# Patient Record
Sex: FEMALE | Race: WHITE | NOT HISPANIC OR LATINO | Employment: OTHER | URBAN - METROPOLITAN AREA
[De-identification: names, ages, dates, MRNs, and addresses within clinical notes are randomized per-mention and may not be internally consistent; named-entity substitution may affect disease eponyms.]

---

## 2017-02-07 ENCOUNTER — COMMUNICATION - HEALTHEAST (OUTPATIENT)
Dept: INTERNAL MEDICINE | Facility: CLINIC | Age: 82
End: 2017-02-07

## 2017-03-10 ENCOUNTER — COMMUNICATION - HEALTHEAST (OUTPATIENT)
Dept: INTERNAL MEDICINE | Facility: CLINIC | Age: 82
End: 2017-03-10

## 2017-03-10 ENCOUNTER — OFFICE VISIT - HEALTHEAST (OUTPATIENT)
Dept: INTERNAL MEDICINE | Facility: CLINIC | Age: 82
End: 2017-03-10

## 2017-03-10 DIAGNOSIS — Z78.9 NONSMOKER: ICD-10-CM

## 2017-03-10 DIAGNOSIS — L73.8 SEBACEOUS HYPERPLASIA: ICD-10-CM

## 2017-03-10 DIAGNOSIS — Z78.9 FULL CODE STATUS: ICD-10-CM

## 2017-03-10 DIAGNOSIS — I10 HTN (HYPERTENSION): ICD-10-CM

## 2017-03-10 DIAGNOSIS — I48.0 PAROXYSMAL ATRIAL FIBRILLATION (H): ICD-10-CM

## 2017-03-10 DIAGNOSIS — F41.9 ANXIETY: ICD-10-CM

## 2017-03-10 DIAGNOSIS — G47.33 OSA (OBSTRUCTIVE SLEEP APNEA): ICD-10-CM

## 2017-03-10 DIAGNOSIS — J30.9 AR (ALLERGIC RHINITIS): ICD-10-CM

## 2017-03-10 DIAGNOSIS — R53.83 FATIGUE: ICD-10-CM

## 2017-03-12 ENCOUNTER — COMMUNICATION - HEALTHEAST (OUTPATIENT)
Dept: INTERNAL MEDICINE | Facility: CLINIC | Age: 82
End: 2017-03-12

## 2017-03-13 ENCOUNTER — COMMUNICATION - HEALTHEAST (OUTPATIENT)
Dept: INTERNAL MEDICINE | Facility: CLINIC | Age: 82
End: 2017-03-13

## 2017-03-13 DIAGNOSIS — G47.33 OSA (OBSTRUCTIVE SLEEP APNEA): ICD-10-CM

## 2017-03-14 ENCOUNTER — RECORDS - HEALTHEAST (OUTPATIENT)
Dept: ADMINISTRATIVE | Facility: OTHER | Age: 82
End: 2017-03-14

## 2017-03-17 ENCOUNTER — COMMUNICATION - HEALTHEAST (OUTPATIENT)
Dept: INTERNAL MEDICINE | Facility: CLINIC | Age: 82
End: 2017-03-17

## 2017-03-29 ENCOUNTER — RECORDS - HEALTHEAST (OUTPATIENT)
Dept: ADMINISTRATIVE | Facility: OTHER | Age: 82
End: 2017-03-29

## 2017-03-30 ENCOUNTER — RECORDS - HEALTHEAST (OUTPATIENT)
Dept: ADMINISTRATIVE | Facility: OTHER | Age: 82
End: 2017-03-30

## 2017-05-18 ENCOUNTER — RECORDS - HEALTHEAST (OUTPATIENT)
Dept: ADMINISTRATIVE | Facility: OTHER | Age: 82
End: 2017-05-18

## 2017-07-24 ENCOUNTER — RECORDS - HEALTHEAST (OUTPATIENT)
Dept: ADMINISTRATIVE | Facility: OTHER | Age: 82
End: 2017-07-24

## 2017-08-14 ENCOUNTER — RECORDS - HEALTHEAST (OUTPATIENT)
Dept: ADMINISTRATIVE | Facility: OTHER | Age: 82
End: 2017-08-14

## 2017-08-15 ENCOUNTER — OFFICE VISIT - HEALTHEAST (OUTPATIENT)
Dept: INTERNAL MEDICINE | Facility: CLINIC | Age: 82
End: 2017-08-15

## 2017-08-15 DIAGNOSIS — Z79.899 CONTROLLED SUBSTANCE AGREEMENT SIGNED: ICD-10-CM

## 2017-08-15 DIAGNOSIS — R20.0 LEFT LEG NUMBNESS: ICD-10-CM

## 2017-08-15 DIAGNOSIS — R68.84 JAW PAIN: ICD-10-CM

## 2017-08-15 DIAGNOSIS — G47.33 OSA (OBSTRUCTIVE SLEEP APNEA): ICD-10-CM

## 2017-08-15 DIAGNOSIS — I10 HTN (HYPERTENSION): ICD-10-CM

## 2017-08-15 DIAGNOSIS — F41.9 ANXIETY: ICD-10-CM

## 2017-09-21 ENCOUNTER — COMMUNICATION - HEALTHEAST (OUTPATIENT)
Dept: INTERNAL MEDICINE | Facility: CLINIC | Age: 82
End: 2017-09-21

## 2017-09-21 DIAGNOSIS — F41.9 ANXIETY: ICD-10-CM

## 2018-02-27 ENCOUNTER — OFFICE VISIT - HEALTHEAST (OUTPATIENT)
Dept: INTERNAL MEDICINE | Facility: CLINIC | Age: 83
End: 2018-02-27

## 2018-02-27 ENCOUNTER — COMMUNICATION - HEALTHEAST (OUTPATIENT)
Dept: INTERNAL MEDICINE | Facility: CLINIC | Age: 83
End: 2018-02-27

## 2018-02-27 DIAGNOSIS — I10 HTN (HYPERTENSION): ICD-10-CM

## 2018-02-27 DIAGNOSIS — Z51.81 ENCOUNTER FOR THERAPEUTIC DRUG LEVEL MONITORING: ICD-10-CM

## 2018-02-27 DIAGNOSIS — R06.02 SOB (SHORTNESS OF BREATH): ICD-10-CM

## 2018-02-27 DIAGNOSIS — F41.9 ANXIETY: ICD-10-CM

## 2018-02-27 DIAGNOSIS — G47.33 OSA (OBSTRUCTIVE SLEEP APNEA): ICD-10-CM

## 2018-02-27 LAB
ALBUMIN UR-MCNC: NEGATIVE MG/DL
AMPHETAMINES UR QL SCN: NORMAL
ANION GAP SERPL CALCULATED.3IONS-SCNC: 8 MMOL/L (ref 5–18)
APPEARANCE UR: CLEAR
BACTERIA #/AREA URNS HPF: ABNORMAL HPF
BARBITURATES UR QL: NORMAL
BENZODIAZ UR QL: NORMAL
BILIRUB UR QL STRIP: NEGATIVE
BNP SERPL-MCNC: 33 PG/ML (ref 0–159)
BUN SERPL-MCNC: 13 MG/DL (ref 8–28)
CALCIUM SERPL-MCNC: 9.2 MG/DL (ref 8.5–10.5)
CANNABINOIDS UR QL SCN: NORMAL
CHLORIDE BLD-SCNC: 107 MMOL/L (ref 98–107)
CO2 SERPL-SCNC: 26 MMOL/L (ref 22–31)
COCAINE UR QL: NORMAL
COLOR UR AUTO: YELLOW
CREAT SERPL-MCNC: 0.72 MG/DL (ref 0.6–1.1)
CREAT UR-MCNC: 23.2 MG/DL
ERYTHROCYTE [DISTWIDTH] IN BLOOD BY AUTOMATED COUNT: 14.3 % (ref 11–14.5)
GFR SERPL CREATININE-BSD FRML MDRD: >60 ML/MIN/1.73M2
GLUCOSE BLD-MCNC: 105 MG/DL (ref 70–125)
GLUCOSE UR STRIP-MCNC: NEGATIVE MG/DL
HCT VFR BLD AUTO: 40.2 % (ref 35–47)
HGB BLD-MCNC: 13.3 G/DL (ref 12–16)
HGB UR QL STRIP: NEGATIVE
KETONES UR STRIP-MCNC: NEGATIVE MG/DL
LEUKOCYTE ESTERASE UR QL STRIP: NEGATIVE
MCH RBC QN AUTO: 25.6 PG (ref 27–34)
MCHC RBC AUTO-ENTMCNC: 33 G/DL (ref 32–36)
MCV RBC AUTO: 78 FL (ref 80–100)
METHADONE UR QL SCN: NORMAL
NITRATE UR QL: NEGATIVE
OPIATES UR QL SCN: NORMAL
OXYCODONE UR QL: NORMAL
PCP UR QL SCN: NORMAL
PH UR STRIP: 7 [PH] (ref 5–8)
PLATELET # BLD AUTO: 216 THOU/UL (ref 140–440)
PMV BLD AUTO: 8.7 FL (ref 7–10)
POTASSIUM BLD-SCNC: 4.6 MMOL/L (ref 3.5–5)
RBC # BLD AUTO: 5.18 MILL/UL (ref 3.8–5.4)
RBC #/AREA URNS AUTO: ABNORMAL HPF
SODIUM SERPL-SCNC: 141 MMOL/L (ref 136–145)
SP GR UR STRIP: 1.01 (ref 1–1.03)
SQUAMOUS #/AREA URNS AUTO: ABNORMAL LPF
UROBILINOGEN UR STRIP-ACNC: ABNORMAL
WBC #/AREA URNS AUTO: ABNORMAL HPF
WBC: 5.5 THOU/UL (ref 4–11)

## 2018-02-27 ASSESSMENT — MIFFLIN-ST. JEOR: SCORE: 1513.88

## 2018-04-25 ENCOUNTER — COMMUNICATION - HEALTHEAST (OUTPATIENT)
Dept: INTERNAL MEDICINE | Facility: CLINIC | Age: 83
End: 2018-04-25

## 2018-04-27 ENCOUNTER — COMMUNICATION - HEALTHEAST (OUTPATIENT)
Dept: FAMILY MEDICINE | Facility: CLINIC | Age: 83
End: 2018-04-27

## 2018-04-27 ENCOUNTER — OFFICE VISIT - HEALTHEAST (OUTPATIENT)
Dept: INTERNAL MEDICINE | Facility: CLINIC | Age: 83
End: 2018-04-27

## 2018-04-27 DIAGNOSIS — I10 HTN (HYPERTENSION): ICD-10-CM

## 2018-04-27 DIAGNOSIS — H26.9 CATARACT, BILATERAL: ICD-10-CM

## 2018-04-27 DIAGNOSIS — J30.9 AR (ALLERGIC RHINITIS): ICD-10-CM

## 2018-04-27 DIAGNOSIS — F41.9 ANXIETY: ICD-10-CM

## 2018-04-27 DIAGNOSIS — Z01.810 PREOPERATIVE CARDIOVASCULAR EXAMINATION: ICD-10-CM

## 2018-04-27 DIAGNOSIS — H35.30 MACULAR DEGENERATION (SENILE) OF RETINA: ICD-10-CM

## 2018-04-27 LAB
ANION GAP SERPL CALCULATED.3IONS-SCNC: 9 MMOL/L (ref 5–18)
BUN SERPL-MCNC: 17 MG/DL (ref 8–28)
CALCIUM SERPL-MCNC: 10 MG/DL (ref 8.5–10.5)
CHLORIDE BLD-SCNC: 108 MMOL/L (ref 98–107)
CO2 SERPL-SCNC: 25 MMOL/L (ref 22–31)
CREAT SERPL-MCNC: 0.74 MG/DL (ref 0.6–1.1)
GFR SERPL CREATININE-BSD FRML MDRD: >60 ML/MIN/1.73M2
GLUCOSE BLD-MCNC: 143 MG/DL (ref 70–125)
POTASSIUM BLD-SCNC: 3.9 MMOL/L (ref 3.5–5)
SODIUM SERPL-SCNC: 142 MMOL/L (ref 136–145)

## 2018-08-10 ENCOUNTER — RECORDS - HEALTHEAST (OUTPATIENT)
Dept: ADMINISTRATIVE | Facility: OTHER | Age: 83
End: 2018-08-10

## 2018-08-31 ENCOUNTER — COMMUNICATION - HEALTHEAST (OUTPATIENT)
Dept: INTERNAL MEDICINE | Facility: CLINIC | Age: 83
End: 2018-08-31

## 2018-08-31 ENCOUNTER — OFFICE VISIT - HEALTHEAST (OUTPATIENT)
Dept: INTERNAL MEDICINE | Facility: CLINIC | Age: 83
End: 2018-08-31

## 2018-08-31 DIAGNOSIS — I10 HTN (HYPERTENSION): ICD-10-CM

## 2018-08-31 DIAGNOSIS — M54.10 BACK PAIN WITH LEFT-SIDED RADICULOPATHY: ICD-10-CM

## 2018-08-31 DIAGNOSIS — Z23 NEED FOR INFLUENZA VACCINATION: ICD-10-CM

## 2018-08-31 DIAGNOSIS — I48.0 PAROXYSMAL ATRIAL FIBRILLATION (H): ICD-10-CM

## 2018-08-31 DIAGNOSIS — E66.01 MORBID OBESITY (H): ICD-10-CM

## 2018-08-31 DIAGNOSIS — H35.30 MACULAR DEGENERATION (SENILE) OF RETINA: ICD-10-CM

## 2018-08-31 DIAGNOSIS — M54.9 BACK PAIN: ICD-10-CM

## 2018-08-31 DIAGNOSIS — Z79.899 CONTROLLED SUBSTANCE AGREEMENT SIGNED: ICD-10-CM

## 2018-08-31 DIAGNOSIS — M79.605 LEFT LEG PAIN: ICD-10-CM

## 2018-08-31 DIAGNOSIS — M25.562 LEFT KNEE PAIN: ICD-10-CM

## 2018-08-31 DIAGNOSIS — F41.9 ANXIETY: ICD-10-CM

## 2018-08-31 DIAGNOSIS — M25.552 HIP PAIN, LEFT: ICD-10-CM

## 2018-09-10 ENCOUNTER — COMMUNICATION - HEALTHEAST (OUTPATIENT)
Dept: INTERNAL MEDICINE | Facility: CLINIC | Age: 83
End: 2018-09-10

## 2018-09-10 DIAGNOSIS — F41.9 ANXIETY: ICD-10-CM

## 2018-09-11 ENCOUNTER — RECORDS - HEALTHEAST (OUTPATIENT)
Dept: ADMINISTRATIVE | Facility: OTHER | Age: 83
End: 2018-09-11

## 2018-09-17 ENCOUNTER — COMMUNICATION - HEALTHEAST (OUTPATIENT)
Dept: INTERNAL MEDICINE | Facility: CLINIC | Age: 83
End: 2018-09-17

## 2018-09-17 DIAGNOSIS — M54.9 BACK PAIN: ICD-10-CM

## 2018-09-17 DIAGNOSIS — M79.662 PAIN OF LEFT LOWER LEG: ICD-10-CM

## 2018-09-17 DIAGNOSIS — M25.552 HIP PAIN, LEFT: ICD-10-CM

## 2018-10-25 ENCOUNTER — RECORDS - HEALTHEAST (OUTPATIENT)
Dept: ADMINISTRATIVE | Facility: OTHER | Age: 83
End: 2018-10-25

## 2018-11-02 ENCOUNTER — COMMUNICATION - HEALTHEAST (OUTPATIENT)
Dept: INTERNAL MEDICINE | Facility: CLINIC | Age: 83
End: 2018-11-02

## 2018-12-10 ENCOUNTER — COMMUNICATION - HEALTHEAST (OUTPATIENT)
Dept: INTERNAL MEDICINE | Facility: CLINIC | Age: 83
End: 2018-12-10

## 2018-12-31 ENCOUNTER — RECORDS - HEALTHEAST (OUTPATIENT)
Dept: ADMINISTRATIVE | Facility: OTHER | Age: 83
End: 2018-12-31

## 2019-02-15 ENCOUNTER — COMMUNICATION - HEALTHEAST (OUTPATIENT)
Dept: SCHEDULING | Facility: CLINIC | Age: 84
End: 2019-02-15

## 2019-02-15 ENCOUNTER — COMMUNICATION - HEALTHEAST (OUTPATIENT)
Dept: INTERNAL MEDICINE | Facility: CLINIC | Age: 84
End: 2019-02-15

## 2019-02-28 ENCOUNTER — OFFICE VISIT - HEALTHEAST (OUTPATIENT)
Dept: INTERNAL MEDICINE | Facility: CLINIC | Age: 84
End: 2019-02-28

## 2019-02-28 DIAGNOSIS — E66.01 MORBID OBESITY (H): ICD-10-CM

## 2019-02-28 DIAGNOSIS — J30.9 AR (ALLERGIC RHINITIS): ICD-10-CM

## 2019-02-28 DIAGNOSIS — Z79.899 CONTROLLED SUBSTANCE AGREEMENT SIGNED: ICD-10-CM

## 2019-02-28 DIAGNOSIS — Z51.81 ENCOUNTER FOR THERAPEUTIC DRUG MONITORING: ICD-10-CM

## 2019-02-28 DIAGNOSIS — I48.0 PAROXYSMAL ATRIAL FIBRILLATION (H): ICD-10-CM

## 2019-02-28 DIAGNOSIS — J32.9 RHINOSINUSITIS: ICD-10-CM

## 2019-02-28 DIAGNOSIS — I10 ESSENTIAL HYPERTENSION: ICD-10-CM

## 2019-02-28 DIAGNOSIS — M79.662 PAIN OF LEFT LOWER LEG: ICD-10-CM

## 2019-02-28 DIAGNOSIS — F41.9 ANXIETY: ICD-10-CM

## 2019-02-28 DIAGNOSIS — Z02.89 ENCOUNTER FOR COMPLETION OF FORM WITH PATIENT: ICD-10-CM

## 2019-02-28 LAB
AMPHETAMINES UR QL SCN: NORMAL
BARBITURATES UR QL: NORMAL
BENZODIAZ UR QL: NORMAL
CANNABINOIDS UR QL SCN: NORMAL
COCAINE UR QL: NORMAL
CREAT UR-MCNC: 79.3 MG/DL
METHADONE UR QL SCN: NORMAL
OPIATES UR QL SCN: NORMAL
OXYCODONE UR QL: NORMAL
PCP UR QL SCN: NORMAL

## 2019-05-20 ENCOUNTER — COMMUNICATION - HEALTHEAST (OUTPATIENT)
Dept: SCHEDULING | Facility: CLINIC | Age: 84
End: 2019-05-20

## 2019-05-20 DIAGNOSIS — J32.9 RHINOSINUSITIS: ICD-10-CM

## 2019-05-29 ENCOUNTER — COMMUNICATION - HEALTHEAST (OUTPATIENT)
Dept: INTERNAL MEDICINE | Facility: CLINIC | Age: 84
End: 2019-05-29

## 2019-05-30 ENCOUNTER — OFFICE VISIT - HEALTHEAST (OUTPATIENT)
Dept: INTERNAL MEDICINE | Facility: CLINIC | Age: 84
End: 2019-05-30

## 2019-05-30 ENCOUNTER — COMMUNICATION - HEALTHEAST (OUTPATIENT)
Dept: INTERNAL MEDICINE | Facility: CLINIC | Age: 84
End: 2019-05-30

## 2019-05-30 DIAGNOSIS — I10 ESSENTIAL HYPERTENSION: ICD-10-CM

## 2019-05-30 DIAGNOSIS — R05.3 PERSISTENT COUGH: ICD-10-CM

## 2019-05-30 DIAGNOSIS — F41.9 ANXIETY: ICD-10-CM

## 2019-05-30 DIAGNOSIS — J32.9 RHINOSINUSITIS: ICD-10-CM

## 2019-06-06 ENCOUNTER — COMMUNICATION - HEALTHEAST (OUTPATIENT)
Dept: INTERNAL MEDICINE | Facility: CLINIC | Age: 84
End: 2019-06-06

## 2019-08-16 ENCOUNTER — COMMUNICATION - HEALTHEAST (OUTPATIENT)
Dept: INTERNAL MEDICINE | Facility: CLINIC | Age: 84
End: 2019-08-16

## 2019-08-16 ENCOUNTER — OFFICE VISIT - HEALTHEAST (OUTPATIENT)
Dept: INTERNAL MEDICINE | Facility: CLINIC | Age: 84
End: 2019-08-16

## 2019-08-16 DIAGNOSIS — G47.33 OSA (OBSTRUCTIVE SLEEP APNEA): ICD-10-CM

## 2019-08-16 DIAGNOSIS — H35.3230 BILATERAL EXUDATIVE AGE-RELATED MACULAR DEGENERATION, UNSPECIFIED STAGE (H): ICD-10-CM

## 2019-08-16 DIAGNOSIS — I10 ESSENTIAL HYPERTENSION: ICD-10-CM

## 2019-08-16 DIAGNOSIS — M76.62 LEFT ACHILLES TENDINITIS: ICD-10-CM

## 2019-08-16 DIAGNOSIS — F41.9 ANXIETY: ICD-10-CM

## 2019-08-16 DIAGNOSIS — Z79.899 CONTROLLED SUBSTANCE AGREEMENT SIGNED: ICD-10-CM

## 2019-08-16 DIAGNOSIS — I48.0 PAROXYSMAL ATRIAL FIBRILLATION (H): ICD-10-CM

## 2019-08-16 DIAGNOSIS — J30.9 ALLERGIC RHINITIS, UNSPECIFIED SEASONALITY, UNSPECIFIED TRIGGER: ICD-10-CM

## 2019-08-16 DIAGNOSIS — R73.9 ELEVATED BLOOD SUGAR: ICD-10-CM

## 2019-08-16 DIAGNOSIS — R06.02 SOB (SHORTNESS OF BREATH): ICD-10-CM

## 2019-08-16 DIAGNOSIS — F51.01 PRIMARY INSOMNIA: ICD-10-CM

## 2019-08-16 LAB
ANION GAP SERPL CALCULATED.3IONS-SCNC: 10 MMOL/L (ref 5–18)
BNP SERPL-MCNC: 26 PG/ML (ref 0–163)
BUN SERPL-MCNC: 12 MG/DL (ref 8–28)
CALCIUM SERPL-MCNC: 10.1 MG/DL (ref 8.5–10.5)
CHLORIDE BLD-SCNC: 106 MMOL/L (ref 98–107)
CO2 SERPL-SCNC: 25 MMOL/L (ref 22–31)
CREAT SERPL-MCNC: 0.71 MG/DL (ref 0.6–1.1)
ERYTHROCYTE [DISTWIDTH] IN BLOOD BY AUTOMATED COUNT: 13.9 % (ref 11–14.5)
GFR SERPL CREATININE-BSD FRML MDRD: >60 ML/MIN/1.73M2
GLUCOSE BLD-MCNC: 103 MG/DL (ref 70–125)
HBA1C MFR BLD: 6.3 % (ref 3.5–6)
HCT VFR BLD AUTO: 41.5 % (ref 35–47)
HGB BLD-MCNC: 13.6 G/DL (ref 12–16)
MCH RBC QN AUTO: 25.5 PG (ref 27–34)
MCHC RBC AUTO-ENTMCNC: 32.8 G/DL (ref 32–36)
MCV RBC AUTO: 78 FL (ref 80–100)
PLATELET # BLD AUTO: 235 THOU/UL (ref 140–440)
PMV BLD AUTO: 8.7 FL (ref 7–10)
POTASSIUM BLD-SCNC: 4.4 MMOL/L (ref 3.5–5)
RBC # BLD AUTO: 5.33 MILL/UL (ref 3.8–5.4)
SODIUM SERPL-SCNC: 141 MMOL/L (ref 136–145)
WBC: 5.1 THOU/UL (ref 4–11)

## 2019-10-11 ENCOUNTER — COMMUNICATION - HEALTHEAST (OUTPATIENT)
Dept: SCHEDULING | Facility: CLINIC | Age: 84
End: 2019-10-11

## 2019-10-14 ENCOUNTER — OFFICE VISIT - HEALTHEAST (OUTPATIENT)
Dept: INTERNAL MEDICINE | Facility: CLINIC | Age: 84
End: 2019-10-14

## 2019-10-14 ENCOUNTER — COMMUNICATION - HEALTHEAST (OUTPATIENT)
Dept: INTERNAL MEDICINE | Facility: CLINIC | Age: 84
End: 2019-10-14

## 2019-10-14 DIAGNOSIS — R60.0 LEG EDEMA, LEFT: ICD-10-CM

## 2019-10-14 DIAGNOSIS — M76.62 LEFT ACHILLES TENDINITIS: ICD-10-CM

## 2019-10-14 DIAGNOSIS — I48.0 PAROXYSMAL ATRIAL FIBRILLATION (H): ICD-10-CM

## 2019-10-14 DIAGNOSIS — J30.9 ALLERGIC RHINITIS, UNSPECIFIED SEASONALITY, UNSPECIFIED TRIGGER: ICD-10-CM

## 2019-10-14 DIAGNOSIS — I10 ESSENTIAL HYPERTENSION: ICD-10-CM

## 2019-11-18 ENCOUNTER — COMMUNICATION - HEALTHEAST (OUTPATIENT)
Dept: INTERNAL MEDICINE | Facility: CLINIC | Age: 84
End: 2019-11-18

## 2020-02-24 ENCOUNTER — OFFICE VISIT - HEALTHEAST (OUTPATIENT)
Dept: INTERNAL MEDICINE | Facility: CLINIC | Age: 85
End: 2020-02-24

## 2020-02-24 DIAGNOSIS — Z79.899 CONTROLLED SUBSTANCE AGREEMENT SIGNED: ICD-10-CM

## 2020-02-24 DIAGNOSIS — I48.0 PAROXYSMAL ATRIAL FIBRILLATION (H): ICD-10-CM

## 2020-02-24 DIAGNOSIS — G47.33 OSA (OBSTRUCTIVE SLEEP APNEA): ICD-10-CM

## 2020-02-24 DIAGNOSIS — F41.9 ANXIETY: ICD-10-CM

## 2020-02-24 DIAGNOSIS — Z51.81 ENCOUNTER FOR THERAPEUTIC DRUG LEVEL MONITORING: ICD-10-CM

## 2020-02-24 DIAGNOSIS — Z87.39 HISTORY OF BURNING PAIN IN LEG: ICD-10-CM

## 2020-02-24 DIAGNOSIS — H35.3230 BILATERAL EXUDATIVE AGE-RELATED MACULAR DEGENERATION, UNSPECIFIED STAGE (H): ICD-10-CM

## 2020-02-24 DIAGNOSIS — E66.01 MORBID OBESITY (H): ICD-10-CM

## 2020-02-24 DIAGNOSIS — I10 ESSENTIAL HYPERTENSION: ICD-10-CM

## 2020-02-24 LAB
AMPHETAMINES UR QL SCN: NORMAL
BARBITURATES UR QL: NORMAL
BENZODIAZ UR QL: NORMAL
CANNABINOIDS UR QL SCN: NORMAL
COCAINE UR QL: NORMAL
CREAT UR-MCNC: 37.6 MG/DL
METHADONE UR QL SCN: NORMAL
OPIATES UR QL SCN: NORMAL
OXYCODONE UR QL: NORMAL
PCP UR QL SCN: NORMAL

## 2020-02-24 ASSESSMENT — ANXIETY QUESTIONNAIRES
7. FEELING AFRAID AS IF SOMETHING AWFUL MIGHT HAPPEN: NOT AT ALL
GAD7 TOTAL SCORE: 4
3. WORRYING TOO MUCH ABOUT DIFFERENT THINGS: SEVERAL DAYS
1. FEELING NERVOUS, ANXIOUS, OR ON EDGE: SEVERAL DAYS
2. NOT BEING ABLE TO STOP OR CONTROL WORRYING: SEVERAL DAYS
IF YOU CHECKED OFF ANY PROBLEMS ON THIS QUESTIONNAIRE, HOW DIFFICULT HAVE THESE PROBLEMS MADE IT FOR YOU TO DO YOUR WORK, TAKE CARE OF THINGS AT HOME, OR GET ALONG WITH OTHER PEOPLE: NOT DIFFICULT AT ALL
6. BECOMING EASILY ANNOYED OR IRRITABLE: NOT AT ALL
5. BEING SO RESTLESS THAT IT IS HARD TO SIT STILL: NOT AT ALL
4. TROUBLE RELAXING: SEVERAL DAYS

## 2020-03-26 ENCOUNTER — COMMUNICATION - HEALTHEAST (OUTPATIENT)
Dept: INTERNAL MEDICINE | Facility: CLINIC | Age: 85
End: 2020-03-26

## 2020-03-26 DIAGNOSIS — F41.9 ANXIETY: ICD-10-CM

## 2020-03-26 DIAGNOSIS — J30.9 ALLERGIC RHINITIS, UNSPECIFIED SEASONALITY, UNSPECIFIED TRIGGER: ICD-10-CM

## 2020-03-26 DIAGNOSIS — I10 ESSENTIAL HYPERTENSION: ICD-10-CM

## 2020-08-18 ENCOUNTER — OFFICE VISIT - HEALTHEAST (OUTPATIENT)
Dept: INTERNAL MEDICINE | Facility: CLINIC | Age: 85
End: 2020-08-18

## 2020-08-18 DIAGNOSIS — H35.3230 BILATERAL EXUDATIVE AGE-RELATED MACULAR DEGENERATION, UNSPECIFIED STAGE (H): ICD-10-CM

## 2020-08-18 DIAGNOSIS — R20.0 LEFT LEG NUMBNESS: ICD-10-CM

## 2020-08-18 DIAGNOSIS — I48.0 PAROXYSMAL ATRIAL FIBRILLATION (H): ICD-10-CM

## 2020-08-18 DIAGNOSIS — I10 ESSENTIAL HYPERTENSION: ICD-10-CM

## 2020-08-18 DIAGNOSIS — R79.9 ABNORMAL FINDING OF BLOOD CHEMISTRY, UNSPECIFIED: ICD-10-CM

## 2020-08-18 DIAGNOSIS — F41.9 ANXIETY: ICD-10-CM

## 2020-08-18 DIAGNOSIS — H93.8X9 SENSATION OF FULLNESS IN EAR, UNSPECIFIED LATERALITY: ICD-10-CM

## 2020-08-18 DIAGNOSIS — Z79.899 CONTROLLED SUBSTANCE AGREEMENT SIGNED: ICD-10-CM

## 2020-08-18 DIAGNOSIS — E66.01 MORBID OBESITY (H): ICD-10-CM

## 2020-08-18 LAB
ANION GAP SERPL CALCULATED.3IONS-SCNC: 9 MMOL/L (ref 5–18)
BUN SERPL-MCNC: 16 MG/DL (ref 8–28)
CALCIUM SERPL-MCNC: 9.5 MG/DL (ref 8.5–10.5)
CHLORIDE BLD-SCNC: 106 MMOL/L (ref 98–107)
CO2 SERPL-SCNC: 28 MMOL/L (ref 22–31)
CREAT SERPL-MCNC: 0.72 MG/DL (ref 0.6–1.1)
ERYTHROCYTE [DISTWIDTH] IN BLOOD BY AUTOMATED COUNT: 13.1 % (ref 11–14.5)
GFR SERPL CREATININE-BSD FRML MDRD: >60 ML/MIN/1.73M2
GLUCOSE BLD-MCNC: 102 MG/DL (ref 70–125)
HBA1C MFR BLD: 6 %
HCT VFR BLD AUTO: 41.1 % (ref 35–47)
HGB BLD-MCNC: 13.6 G/DL (ref 12–16)
MCH RBC QN AUTO: 26.8 PG (ref 27–34)
MCHC RBC AUTO-ENTMCNC: 33 G/DL (ref 32–36)
MCV RBC AUTO: 81 FL (ref 80–100)
PLATELET # BLD AUTO: 205 THOU/UL (ref 140–440)
PMV BLD AUTO: 10 FL (ref 7–10)
POTASSIUM BLD-SCNC: 4.3 MMOL/L (ref 3.5–5)
RBC # BLD AUTO: 5.05 MILL/UL (ref 3.8–5.4)
SODIUM SERPL-SCNC: 143 MMOL/L (ref 136–145)
WBC: 4.7 THOU/UL (ref 4–11)

## 2020-08-18 RX ORDER — FEXOFENADINE HCL 60 MG/1
60 TABLET, FILM COATED ORAL DAILY
Status: SHIPPED | COMMUNITY
Start: 2020-08-18

## 2020-08-18 ASSESSMENT — ANXIETY QUESTIONNAIRES
2. NOT BEING ABLE TO STOP OR CONTROL WORRYING: MORE THAN HALF THE DAYS
3. WORRYING TOO MUCH ABOUT DIFFERENT THINGS: MORE THAN HALF THE DAYS
1. FEELING NERVOUS, ANXIOUS, OR ON EDGE: SEVERAL DAYS
6. BECOMING EASILY ANNOYED OR IRRITABLE: MORE THAN HALF THE DAYS
5. BEING SO RESTLESS THAT IT IS HARD TO SIT STILL: SEVERAL DAYS
4. TROUBLE RELAXING: SEVERAL DAYS
7. FEELING AFRAID AS IF SOMETHING AWFUL MIGHT HAPPEN: MORE THAN HALF THE DAYS
IF YOU CHECKED OFF ANY PROBLEMS ON THIS QUESTIONNAIRE, HOW DIFFICULT HAVE THESE PROBLEMS MADE IT FOR YOU TO DO YOUR WORK, TAKE CARE OF THINGS AT HOME, OR GET ALONG WITH OTHER PEOPLE: NOT DIFFICULT AT ALL
GAD7 TOTAL SCORE: 11

## 2020-08-19 ENCOUNTER — COMMUNICATION - HEALTHEAST (OUTPATIENT)
Dept: INTERNAL MEDICINE | Facility: CLINIC | Age: 85
End: 2020-08-19

## 2020-08-20 ENCOUNTER — RECORDS - HEALTHEAST (OUTPATIENT)
Dept: ADMINISTRATIVE | Facility: OTHER | Age: 85
End: 2020-08-20

## 2020-09-24 ENCOUNTER — RECORDS - HEALTHEAST (OUTPATIENT)
Dept: ADMINISTRATIVE | Facility: OTHER | Age: 85
End: 2020-09-24

## 2020-09-29 ENCOUNTER — COMMUNICATION - HEALTHEAST (OUTPATIENT)
Dept: FAMILY MEDICINE | Facility: CLINIC | Age: 85
End: 2020-09-29

## 2020-09-29 ENCOUNTER — COMMUNICATION - HEALTHEAST (OUTPATIENT)
Dept: SCHEDULING | Facility: CLINIC | Age: 85
End: 2020-09-29

## 2020-09-29 ENCOUNTER — OFFICE VISIT - HEALTHEAST (OUTPATIENT)
Dept: FAMILY MEDICINE | Facility: CLINIC | Age: 85
End: 2020-09-29

## 2020-09-29 DIAGNOSIS — H92.02 LEFT EAR PAIN: ICD-10-CM

## 2020-09-29 DIAGNOSIS — R59.9 SWELLING OF LYMPH NODE: ICD-10-CM

## 2020-09-29 DIAGNOSIS — J30.9 ALLERGIC RHINITIS, UNSPECIFIED SEASONALITY, UNSPECIFIED TRIGGER: ICD-10-CM

## 2020-10-01 ENCOUNTER — COMMUNICATION - HEALTHEAST (OUTPATIENT)
Dept: INTERNAL MEDICINE | Facility: CLINIC | Age: 85
End: 2020-10-01

## 2020-10-02 ENCOUNTER — COMMUNICATION - HEALTHEAST (OUTPATIENT)
Dept: INTERNAL MEDICINE | Facility: CLINIC | Age: 85
End: 2020-10-02

## 2020-10-02 ENCOUNTER — OFFICE VISIT - HEALTHEAST (OUTPATIENT)
Dept: INTERNAL MEDICINE | Facility: CLINIC | Age: 85
End: 2020-10-02

## 2020-10-02 DIAGNOSIS — J02.9 ST (SORE THROAT): ICD-10-CM

## 2020-10-02 DIAGNOSIS — H92.02 LEFT EAR PAIN: ICD-10-CM

## 2020-10-02 DIAGNOSIS — Z23 IMMUNIZATION DUE: ICD-10-CM

## 2020-10-02 DIAGNOSIS — K11.20 SUBMANDIBULAR SIALOADENITIS: ICD-10-CM

## 2020-10-02 DIAGNOSIS — F41.9 ANXIETY: ICD-10-CM

## 2020-10-02 DIAGNOSIS — J31.0 RHINITIS, UNSPECIFIED TYPE: ICD-10-CM

## 2020-10-02 DIAGNOSIS — I10 ESSENTIAL HYPERTENSION: ICD-10-CM

## 2020-10-02 LAB — DEPRECATED S PYO AG THROAT QL EIA: NORMAL

## 2020-10-02 ASSESSMENT — ANXIETY QUESTIONNAIRES
4. TROUBLE RELAXING: NOT AT ALL
1. FEELING NERVOUS, ANXIOUS, OR ON EDGE: SEVERAL DAYS
GAD7 TOTAL SCORE: 4
3. WORRYING TOO MUCH ABOUT DIFFERENT THINGS: NOT AT ALL
6. BECOMING EASILY ANNOYED OR IRRITABLE: SEVERAL DAYS
7. FEELING AFRAID AS IF SOMETHING AWFUL MIGHT HAPPEN: SEVERAL DAYS
2. NOT BEING ABLE TO STOP OR CONTROL WORRYING: SEVERAL DAYS
5. BEING SO RESTLESS THAT IT IS HARD TO SIT STILL: NOT AT ALL
IF YOU CHECKED OFF ANY PROBLEMS ON THIS QUESTIONNAIRE, HOW DIFFICULT HAVE THESE PROBLEMS MADE IT FOR YOU TO DO YOUR WORK, TAKE CARE OF THINGS AT HOME, OR GET ALONG WITH OTHER PEOPLE: NOT DIFFICULT AT ALL

## 2020-10-03 LAB — GROUP A STREP BY PCR: NORMAL

## 2020-10-15 ENCOUNTER — RECORDS - HEALTHEAST (OUTPATIENT)
Dept: ADMINISTRATIVE | Facility: OTHER | Age: 85
End: 2020-10-15

## 2020-10-18 ENCOUNTER — COMMUNICATION - HEALTHEAST (OUTPATIENT)
Dept: INTERNAL MEDICINE | Facility: CLINIC | Age: 85
End: 2020-10-18

## 2020-10-18 DIAGNOSIS — F41.9 ANXIETY: ICD-10-CM

## 2021-01-19 ENCOUNTER — COMMUNICATION - HEALTHEAST (OUTPATIENT)
Dept: ADMINISTRATIVE | Facility: CLINIC | Age: 86
End: 2021-01-19

## 2021-02-22 ENCOUNTER — OFFICE VISIT - HEALTHEAST (OUTPATIENT)
Dept: INTERNAL MEDICINE | Facility: CLINIC | Age: 86
End: 2021-02-22

## 2021-02-22 DIAGNOSIS — E66.01 MORBID OBESITY (H): ICD-10-CM

## 2021-02-22 DIAGNOSIS — I48.0 PAROXYSMAL ATRIAL FIBRILLATION (H): ICD-10-CM

## 2021-02-22 DIAGNOSIS — I10 ESSENTIAL HYPERTENSION: ICD-10-CM

## 2021-02-22 DIAGNOSIS — H35.3230 BILATERAL EXUDATIVE AGE-RELATED MACULAR DEGENERATION, UNSPECIFIED STAGE (H): ICD-10-CM

## 2021-02-22 DIAGNOSIS — F41.9 ANXIETY: ICD-10-CM

## 2021-02-22 DIAGNOSIS — G47.33 OSA (OBSTRUCTIVE SLEEP APNEA): ICD-10-CM

## 2021-02-22 ASSESSMENT — ANXIETY QUESTIONNAIRES
6. BECOMING EASILY ANNOYED OR IRRITABLE: NOT AT ALL
5. BEING SO RESTLESS THAT IT IS HARD TO SIT STILL: NOT AT ALL
2. NOT BEING ABLE TO STOP OR CONTROL WORRYING: SEVERAL DAYS
7. FEELING AFRAID AS IF SOMETHING AWFUL MIGHT HAPPEN: SEVERAL DAYS
4. TROUBLE RELAXING: SEVERAL DAYS
GAD7 TOTAL SCORE: 5
IF YOU CHECKED OFF ANY PROBLEMS ON THIS QUESTIONNAIRE, HOW DIFFICULT HAVE THESE PROBLEMS MADE IT FOR YOU TO DO YOUR WORK, TAKE CARE OF THINGS AT HOME, OR GET ALONG WITH OTHER PEOPLE: NOT DIFFICULT AT ALL
1. FEELING NERVOUS, ANXIOUS, OR ON EDGE: SEVERAL DAYS
3. WORRYING TOO MUCH ABOUT DIFFERENT THINGS: SEVERAL DAYS

## 2021-02-22 ASSESSMENT — PATIENT HEALTH QUESTIONNAIRE - PHQ9: SUM OF ALL RESPONSES TO PHQ QUESTIONS 1-9: 1

## 2021-04-15 ENCOUNTER — COMMUNICATION - HEALTHEAST (OUTPATIENT)
Dept: SCHEDULING | Facility: CLINIC | Age: 86
End: 2021-04-15

## 2021-04-19 ENCOUNTER — COMMUNICATION - HEALTHEAST (OUTPATIENT)
Dept: ADMINISTRATIVE | Facility: CLINIC | Age: 86
End: 2021-04-19

## 2021-04-19 DIAGNOSIS — J32.9 RHINOSINUSITIS: ICD-10-CM

## 2021-04-19 DIAGNOSIS — F41.9 ANXIETY: ICD-10-CM

## 2021-04-19 DIAGNOSIS — I10 ESSENTIAL HYPERTENSION: ICD-10-CM

## 2021-04-19 RX ORDER — HYDROCHLOROTHIAZIDE 25 MG/1
25 TABLET ORAL DAILY
Qty: 90 TABLET | Refills: 3 | Status: SHIPPED | OUTPATIENT
Start: 2021-04-19 | End: 2022-04-21

## 2021-04-19 RX ORDER — LORAZEPAM 0.5 MG/1
TABLET ORAL
Qty: 30 TABLET | Refills: 1 | Status: SHIPPED | OUTPATIENT
Start: 2021-04-19 | End: 2021-10-25

## 2021-04-19 RX ORDER — AMLODIPINE BESYLATE 5 MG/1
5 TABLET ORAL 2 TIMES DAILY
Qty: 180 TABLET | Refills: 3 | Status: SHIPPED | OUTPATIENT
Start: 2021-04-19 | End: 2022-04-21

## 2021-05-27 ASSESSMENT — PATIENT HEALTH QUESTIONNAIRE - PHQ9: SUM OF ALL RESPONSES TO PHQ QUESTIONS 1-9: 1

## 2021-05-28 ASSESSMENT — ANXIETY QUESTIONNAIRES
GAD7 TOTAL SCORE: 4
GAD7 TOTAL SCORE: 5
GAD7 TOTAL SCORE: 4
GAD7 TOTAL SCORE: 11

## 2021-05-28 NOTE — TELEPHONE ENCOUNTER
I will send in a prescription to her pharmacy.  She will need to follow up if she is not improving in 3-4 days.    Denis Frias MD  [facility]  5/20/2019, 9:18 AM

## 2021-05-28 NOTE — TELEPHONE ENCOUNTER
Patient calling to get prescription called in for sinus infection.  Tender around eyes and plugged up.  Gets them 1-2 x year.  No fever.  Sinus congestion.    Has been treating it as if she has allergies but not improving.    Last OV 2/28/19.    Pharmacy confirmed    Patient will be home if we need to call back.    Mandy Estrada, RN, [facility] Nurse Triage/Med Refills RN     Reason for Disposition    Sinus congestion (pressure, fullness) present > 10 days    Protocols used: SINUS PAIN AND CONGESTION-A-OH

## 2021-05-29 NOTE — TELEPHONE ENCOUNTER
Please call patient -    ______________________________________________________________________     Home Phone:  [phone] (home)     Cell phone:   Telephone Information:   Mobile [phone]     ______________________________________________________________________     Her chest x-ray (CXR) is very normal and there is not any signs of anything more serious going on.    Take the prednisone and follow up if not improving.       Denis Frias MD  [facility]  5/30/2019, 12:40 PM   ______________________________________________________________________    Pertinent radiology for this visit includes the following:    XR Chest 2 Views  EXAM DATE:         05/30/2019    EXAM: X-RAY CHEST, 2 VIEWS, FRONTAL AND LATERAL  LOCATION: [facility] [place]  DATE/TIME: 5/30/2019 11:30 AM    INDICATION: Cough  COMPARISON: 03/21/2014.    FINDINGS: The lungs are clear. The heart size and pulmonary vascularity are  normal. No pneumothorax or pleural effusion. Again noted are flowing  syndesmophytes in the thoracic spine consistent with diffuse idiopathic skeletal  hyperostosis.      ______________________________________________________________________

## 2021-05-29 NOTE — TELEPHONE ENCOUNTER
Lets go ahead and continue to monitor her.  Push oral fluid intake if not already doing.    Denis Frias MD  [facility]  6/6/2019, 1:18 PM

## 2021-05-29 NOTE — TELEPHONE ENCOUNTER
Patient Returning Call  Reason for call:  Results  Information relayed to patient:  The writer read the following to patient per Dr Frias: Her chest x-ray (CXR) is very normal and there is not any signs of anything more serious going on.  Patient has additional questions:  Yes  If YES, what are your questions/concerns:  Writer and patient discussed use of prednisone .  She states she has under laying issues with sleep.  Writer encouraged patient to take the two doses by the afternoon  This will help hopefully with her sleep at night .  Okay to leave a detailed message?: No call back needed

## 2021-05-29 NOTE — TELEPHONE ENCOUNTER
I could see the patient at 11 am tomorrow for follow up on this issue.  Thank you.    Please call patient to schedule this.    Denis Frias MD  [facility]  5/29/2019, 2:08 PM

## 2021-05-29 NOTE — TELEPHONE ENCOUNTER
New Appointment Needed  What is the reason for the visit:    Same Date/Next Day Appt Request  What is the reason for your visit?: SINUS AND ALLERGIES AND COLD NOT GETTING BETTER    Provider Preference: PCP only  How soon do you need to be seen?: tomorrow  Waitlist offered?: No  Okay to leave a detailed message:  Yes      Patient states that PCP told her if she wasn't feeling better to schedule an appointment. Patient wants to see PCP.

## 2021-05-29 NOTE — TELEPHONE ENCOUNTER
Please call patient -    ______________________________________________________________________     Home phone:  [phone] (home)     Cell phone:   Telephone Information:   Mobile [phone]       Other contacts:  Name Home Phone Work Phone Mobile Phone Relationship Lgl Gravery   SASHA DAVIS   [phone] Child    MIC MCKINNEY   [phone] Spouse      ______________________________________________________________________     I did get her message about the results of her prednisone.    Please make sure her cough and sinus symptoms are doing better.    If they are and she is just having some lightheadedness, then I think she could follow her symptoms if she is not too miserable.    If she is not doing well in the next week, then she could see a colleague if not improving.     Please update me with any concerns she has.    Denis Frias MD  [facility]  6/6/2019, 12:44 PM

## 2021-05-29 NOTE — TELEPHONE ENCOUNTER
Called pt and pt states that she was having issues with Diarrhea and was really shaky    BP this am was 150/64  Then it was 129/64     Pt is unsure if this was a bug or side effects of the prednisone.  States that she is feeling better today.    Pt states that she finished the prednisone on Tuesday and the Sx were on Wed.  Thinks that this could have been a SE of the medicatoin

## 2021-05-30 VITALS — BODY MASS INDEX: 40.15 KG/M2 | WEIGHT: 237.6 LBS

## 2021-05-31 VITALS — BODY MASS INDEX: 40.26 KG/M2 | WEIGHT: 238.2 LBS

## 2021-06-01 VITALS — WEIGHT: 236.1 LBS | HEIGHT: 65 IN | BODY MASS INDEX: 39.34 KG/M2

## 2021-06-01 VITALS — WEIGHT: 241 LBS | BODY MASS INDEX: 40.73 KG/M2

## 2021-06-02 VITALS — BODY MASS INDEX: 39.88 KG/M2 | WEIGHT: 236 LBS

## 2021-06-02 NOTE — TELEPHONE ENCOUNTER
Please call patient -    ______________________________________________________________________     Home Phone:  [phone] (home)     Cell phone:   Telephone Information:   Mobile [phone]     ______________________________________________________________________     Her ultrasound showed no signs of blood clot in the leg.    There was no signs of cyst in the leg.    The swelling in her leg is likely due to the irritation in her achilles tendon.  The veins might also not clear fluid from this leg as well.    The amlodipine (Norvasc) may also contribute to the swelling.    If the ankle is bothering her more, other tests can be done or she can continue to follow.    Lets have her come back for follow up in 6-8 weeks.  Please help her to schedule an appointment.     Denis Frias MD  [facility]  10/14/2019, 4:27 PM   ______________________________________________________________________    Pertinent radiology for this visit includes the following:    US Venous Leg Left  EXAM DATE:         10/14/2019    EXAM: US LOWER EXTREMITY VEINS LTD LEFT  LOCATION: [facility]  DATE/TIME: 10/14/2019 3:30 PM    INDICATION: Left leg swelling.  Evaluate for DVT.  Eval for baker`s cyst.  COMPARISON: None.  TECHNIQUE: Venous Duplex ultrasound of the left lower extremity with and without  compression, augmentation and duplex. Color flow and spectral Doppler with  waveform analysis performed.    FINDINGS: Exam includes the common femoral, femoral, popliteal, and  contralateral common femoral veins as well as segmentally visualized deep calf  veins and greater saphenous vein.    LEFT: No deep vein thrombosis. No superficial thrombophlebitis. No popliteal  cyst.    IMPRESSION:  No DVT or popliteal cyst in the left leg.      ______________________________________________________________________

## 2021-06-02 NOTE — TELEPHONE ENCOUNTER
"Call from pt       Re:  L calf swelling       > Was at the DC this am and the DC mentioned to her that her L calf looked swollen and was \"pitted\"     > Is able to stand / walk ok but painful to do so     > Yes some warmth to the touch    > L calf does look different form the R     > Foot not cold / blue       Pt would not want to go to ED for eval      Pt tele# [phone]       Julian Fox, RN   Triage and Medication Refills          Reason for Disposition    Thigh, calf, or ankle swelling in only one leg    Protocols used: LEG SWELLING AND EDEMA-A-OH      "

## 2021-06-02 NOTE — PROGRESS NOTES
Wt Readings from Last 20 Encounters:   10/14/19 (!) 224 lb 4 oz (101.7 kg)   08/16/19 (!) 237 lb (107.5 kg)   05/30/19 (!) 238 lb (108 kg)   02/28/19 (!) 236 lb (107 kg)   04/27/18 (!) 241 lb (109.3 kg)   02/27/18 (!) 236 lb 1.6 oz (107.1 kg)   08/15/17 (!) 238 lb 3.2 oz (108 kg)   03/10/17 (!) 237 lb 9.6 oz (107.8 kg)   06/23/16 (!) 231 lb 12.8 oz (105.1 kg)   04/08/16 (!) 234 lb (106.1 kg)   09/11/15 (!) 231 lb 6.4 oz (105 kg)   05/14/15 (!) 236 lb (107 kg)

## 2021-06-02 NOTE — TELEPHONE ENCOUNTER
Pt informed of Dr. Frias's message.    She states an understanding.    Pt scheduled for 11/25/19 at 9:40.    She thanked for the call.

## 2021-06-02 NOTE — PATIENT INSTRUCTIONS - HE
Please follow up if you have any further issues.    You may contact me by phone or [employer] if you are worsening or if things are not improving.    Thank you for coming in today.

## 2021-06-03 VITALS — WEIGHT: 237 LBS | BODY MASS INDEX: 40.05 KG/M2

## 2021-06-03 VITALS
BODY MASS INDEX: 37.9 KG/M2 | WEIGHT: 224.25 LBS | DIASTOLIC BLOOD PRESSURE: 70 MMHG | SYSTOLIC BLOOD PRESSURE: 112 MMHG | OXYGEN SATURATION: 96 % | HEART RATE: 66 BPM

## 2021-06-03 VITALS — WEIGHT: 238 LBS | BODY MASS INDEX: 40.22 KG/M2

## 2021-06-03 NOTE — TELEPHONE ENCOUNTER
Contacted patient and she said she is doing better and is okay with cancelling the appointment. She said she will call back at a later time to schedule follow up in April.

## 2021-06-03 NOTE — TELEPHONE ENCOUNTER
If she is doing well, this appointment can be cancelled.  She could be scheduled for follow up then in April if she wishes or sooner if she wishes.    Denis Frias MD  General Internal Medicine  [facility]  11/18/2019, 1:50 PM

## 2021-06-03 NOTE — TELEPHONE ENCOUNTER
Who is calling:  Patient  Reason for Call:    Patient states her left leg is much better now.  She is questioning if she needs to come in for her appointment with Provider on 11/25/19.  Please reach out to patient and advise.  Thank you.  Date of last appointment with primary care: 10/14/19  Okay to leave a detailed message: Yes

## 2021-06-04 VITALS
SYSTOLIC BLOOD PRESSURE: 136 MMHG | BODY MASS INDEX: 35.96 KG/M2 | WEIGHT: 212.8 LBS | HEART RATE: 62 BPM | OXYGEN SATURATION: 97 % | DIASTOLIC BLOOD PRESSURE: 62 MMHG

## 2021-06-04 VITALS
BODY MASS INDEX: 36.77 KG/M2 | DIASTOLIC BLOOD PRESSURE: 70 MMHG | OXYGEN SATURATION: 96 % | WEIGHT: 217.6 LBS | HEART RATE: 53 BPM | SYSTOLIC BLOOD PRESSURE: 132 MMHG

## 2021-06-05 VITALS
SYSTOLIC BLOOD PRESSURE: 152 MMHG | OXYGEN SATURATION: 98 % | BODY MASS INDEX: 36.17 KG/M2 | DIASTOLIC BLOOD PRESSURE: 64 MMHG | HEART RATE: 78 BPM | WEIGHT: 214 LBS

## 2021-06-05 VITALS
WEIGHT: 222 LBS | BODY MASS INDEX: 37.52 KG/M2 | DIASTOLIC BLOOD PRESSURE: 72 MMHG | OXYGEN SATURATION: 98 % | SYSTOLIC BLOOD PRESSURE: 138 MMHG | HEART RATE: 66 BPM

## 2021-06-06 NOTE — PATIENT INSTRUCTIONS - HE
Please follow up if you have any further issues.    You may contact me by phone or [employer] if you are worsening or if things are not improving.    Thank you for coming in today.

## 2021-06-06 NOTE — PROGRESS NOTES
Urine drug screen correlates well with reported and known medications taken.    Denis Frias MD  [facility]  2/24/2020, 9:00 PM

## 2021-06-06 NOTE — PROGRESS NOTES
Wt Readings from Last 20 Encounters:   02/24/20 217 lb 9.6 oz (98.7 kg)   10/14/19 (!) 224 lb 4 oz (101.7 kg)   08/16/19 (!) 237 lb (107.5 kg)   05/30/19 (!) 238 lb (108 kg)   02/28/19 (!) 236 lb (107 kg)   04/27/18 (!) 241 lb (109.3 kg)   02/27/18 (!) 236 lb 1.6 oz (107.1 kg)   08/15/17 (!) 238 lb 3.2 oz (108 kg)   03/10/17 (!) 237 lb 9.6 oz (107.8 kg)   06/23/16 (!) 231 lb 12.8 oz (105.1 kg)   04/08/16 (!) 234 lb (106.1 kg)   09/11/15 (!) 231 lb 6.4 oz (105 kg)   05/14/15 (!) 236 lb (107 kg)

## 2021-06-07 NOTE — TELEPHONE ENCOUNTER
Controlled Substance Refill Request  Medication Name:   Requested Prescriptions     Pending Prescriptions Disp Refills     LORazepam (ATIVAN) 0.5 MG tablet 30 tablet 2     Sig: Take 1 tablet (0.5 mg total) by mouth 2 (two) times a day as needed for anxiety. May refill every 30 days only.     Date Last Fill: 8/16/19  [facility]ed [id number]  Submit electronically to pharmacy  Controlled Substance Agreement on file:   Encounter-Level CSA Scan Date - 08/31/2018:    Scan on 9/5/2018  3:35 PM           Encounter-Level CSA Scan Date - 08/15/2017:    Scan on 8/21/2017  1:31 PM: [facility]        Last office visit:  2/24/2020

## 2021-06-07 NOTE — TELEPHONE ENCOUNTER
Last Office Visit  2/24/2020 Denis Frias MD    Notes:  Patient comes in today for follow-up of multiple issues.     She was recently in [place] and did well down there.     She has lost weight related to changing her snacking and smaller portions.  She has done well with this.  Her weight is down as noted below.  This is expected and not associated with GI symptoms.     Reviewed her high blood pressure and her blood pressure is doing well generally.  Her home records are generally in the 110s to 120s.  She denies any worsening edema or shortness of breath.  She denies any chest pain.     We reviewed some burning in her left lateral leg.  It is over the left lateral upper leg.  Her MRI of her back in the past has been good.  She does have some chronic neck pain that has not been worked up.  She sees a chiropractor for this.  It does not hurt more to lay on the left side.     Reviewed her anxiety and things are stable here.     Reviewed her sleep apnea and there is been no changes.    Last Filled:  LORazepam (ATIVAN) 0.5 MG tablet  30 tablet  2  8/16/2019   No    Sig - Route: Take 1 tablet (0.5 mg total) by mouth 2 (two) times a day as needed for anxiety. May refill every 30 days only. - Oral    Sent to pharmacy as: LORazepam (ATIVAN) 0.5 MG tablet    E-Prescribing Status: Receipt confirmed by pharmacy (8/16/2019  9:04 AM CDT)          Lab Results   Component Value Date    AMPHET Screen Negative 02/24/2020    HEBENZODIAZ Screen Negative 02/24/2020    OPIATES Screen Negative 02/24/2020    PCP Screen Negative 02/24/2020    THC Screen Negative 02/24/2020    BARBIT Screen Negative 02/24/2020    COCAINEMETAB Screen Negative 02/24/2020    METHADNE Screen Negative 02/24/2020    OXYCODONE Screen Negative 02/24/2020    CREAUR 37.6 02/24/2020         Next OV:  Visit date not found      Encounter-Level CSA Scan Date - 08/31/2018:    Scan on 9/5/2018  3:35 PM           Encounter-Level CSA Scan Date - 08/15/2017:    Scan on  8/21/2017  1:31 PM: [facility]           A urine drug screen was performed on 2/24/2020   and is negative.       reviewed and results are as follows:    Unable to pull     Medication teed up for provider signature - please adjust as appropriate.

## 2021-06-10 NOTE — PATIENT INSTRUCTIONS - HE

## 2021-06-11 NOTE — TELEPHONE ENCOUNTER
Patient complains of some swelling on left neck/gland; unsure of size.  Also complains of left jaw pain and left ear fullness; denies fever.  Just took some Aleve.  Transferred to [facility] for same day Virtual Visit.    Marisa Alcala RN  [facility] Triage Nurse Advisor    Reason for Disposition    Single large node and size > 1 inch (2.5 cm)    Additional Information    Negative: Sounds like a life-threatening emergency to the triager    Negative: Sore throat is the main symptom and has swollen node in the neck that is < 1 inch (2.5 cm) in size    Negative: Node is in the neck and causes difficulty breathing    Negative: Patient sounds very sick or weak to the triager    Negative: Node is in the neck and can't swallow fluids    Negative: Fever > 103 F (39.4 C)    Negative: Lump or swelling in groin and pulsating (like heartbeat)    Protocols used: LYMPH NODES - LAIGLLR-M-VP

## 2021-06-11 NOTE — PROGRESS NOTES
"Francesca Jay is a 85 y.o. female who is being evaluated via a billable telephone visit.      The patient has been notified of following:     \"This telephone visit will be conducted via a call between you and your physician/provider. We have found that certain health care needs can be provided without the need for a physical exam.  This service lets us provide the care you need with a short phone conversation.  If a prescription is necessary we can send it directly to your pharmacy.  If lab work is needed we can place an order for that and you can then stop by our lab to have the test done at a later time.    Telephone visits are billed at different rates depending on your insurance coverage. During this emergency period, for some insurers they may be billed the same as an in-person visit.  Please reach out to your insurance provider with any questions.    If during the course of the call the physician/provider feels a telephone visit is not appropriate, you will not be charged for this service.\"    Patient has given verbal consent to a Telephone visit? Yes    What phone number would you like to be contacted at? [phone]    Patient would like to receive their AVS by AVS Preference: Mail a copy.    Additional provider notes:  Assessment / Impression     1. Left ear pain     2. Swelling of lymph node     3. Allergic rhinitis, unspecified seasonality, unspecified trigger           Plan:     I explained that it is difficult to assess her ear pain symptoms over the phone.  It sounds like she could either have an ear infection or eustachian tube dysfunction from allergies.  It also sounds like she has a small tender lymph node just anterior to the left ear.  Since she reports symptoms seem to be getting worse I recommended she be seen for clinic appointment.  We are trying to get her in somewhere today.  In the meantime, I recommended she continue her antihistamine.  She may take Tylenol as needed.  She could add " Flonase to the Allegra if she chooses.    Subjective:      HPI: Francesca Jay is a 85 y.o. female who is scheduled for a virtual/telephone appointment to discuss concerns about a small, tender bump that she can feel just anterior to her left ear.  Symptoms started a couple of days ago.  She is also noticing some discomfort in her jaw there and within her ear.  She denies having fevers.  She has a history of allergies and she has noticed some sinus drainage off and on.  She takes Allegra for this.  She denies having ear plugging or ear drainage symptoms.  She is tried Flonase prior to Allegra, but this did not seem to help much.  She denies feeling ill.        Review of Systems  All other systems reviewed and are negative.     Social History     Tobacco Use   Smoking Status Never Smoker   Smokeless Tobacco Never Used       Family History   Problem Relation Age of Onset     Alcohol abuse Mother      Liver disease Mother      Alcohol abuse Father      Colon cancer Brother      Lung cancer Brother      Lung disease Brother      OCD Son      Depression Son        Objective:     There were no vitals taken for this visit.  She does not appear to be in acute distress.  She answers questions appropriately.    No results found for this or any previous visit (from the past 168 hour(s)).    Current Outpatient Medications   Medication Sig Note     amLODIPine (NORVASC) 5 MG tablet Take 1 tablet (5 mg total) by mouth 2 (two) times a day.      aspirin 325 MG tablet Take 325 mg by mouth. 5/14/2015: Received from: [facility]     fexofenadine (ALLEGRA) 60 MG tablet Take 60 mg by mouth daily.      hydroCHLOROthiazide (HYDRODIURIL) 25 MG tablet Take 1 tablet (25 mg total) by mouth daily.      LORazepam (ATIVAN) 0.5 MG tablet Take 1 tablet (0.5 mg total) by mouth 2 (two) times a day as needed for anxiety. May refill every 30 days only.      magnesium 100 mg cap  5/14/2015: Received from: [facility]     OMEGA-3 FATTY ACIDS ORAL  Take 2 g by mouth. 5/14/2015: [facility]     vit A/vit C/vit E/zinc/copper (PRESERVISION AREDS ORAL) Take by mouth daily.      cholecalciferol, vitamin D3, 5,000 unit Tab Take by mouth.      propafenone (RYTHMOL) 300 MG tablet Take 2 tablets (600 mg total) by mouth once for 1 dose. As needed for atrial fibrillation.            Phone call duration:  11 minutes    Tai Bravo DO

## 2021-06-11 NOTE — TELEPHONE ENCOUNTER
Patient unable to get into [facility] but says there is another [facility] clinic that she has been to that she will go to today per pt.

## 2021-06-11 NOTE — TELEPHONE ENCOUNTER
----- Message from Tai Bravo DO sent at 9/29/2020  2:13 PM CDT -----  Regarding: Needs appointment  Please contact this patient and help her schedule an appointment at the [facility] for this afternoon if possible to have her left ear pain symptoms evaluated.  If nothing is available please give her the information about going into the walk-in clinic.  Thank you, DE

## 2021-06-12 NOTE — TELEPHONE ENCOUNTER
Please call patient -    ______________________________________________________________________     Home phone:  [phone] (home)     Cell phone:   Telephone Information:   Mobile [phone]       Other contacts:  Name Home Phone Work Phone Mobile Phone Relationship Lgl Astrid   SASHA DAVIS   [phone] MIC Cabezas   [phone] Spouse      ______________________________________________________________________     Strep test negative.    Okay from patient to leave message if no answer.    Denis Frias MD  [facility]  10/2/2020, 12:57 PM     ______________________________________________________________________    Recent Results (from the past 240 hour(s))   Rapid Strep A Screen-Throat swab    Specimen: Throat   Result Value Ref Range    Rapid Strep A Antigen No Group A Strep detected, presumptive negative No Group A Strep detected, presumptive negative       ______________________________________________________________________

## 2021-06-12 NOTE — PATIENT INSTRUCTIONS - HE

## 2021-06-14 NOTE — TELEPHONE ENCOUNTER
I am recommending that she have it even in light of her history of allergy issues.    Please call the patient back and thank you.    Denis Frias MD  General Internal Medicine  [facility]  1/19/2021, 10:06 PM

## 2021-06-14 NOTE — TELEPHONE ENCOUNTER
Reason for Call:  Other call back      Detailed comments: Patient calling this afternoon to inquire about getting the covid 19 vaccine. She is wondering if this is a good idea for her to do or if it's safe for her to get it and if Dr Frias thinks she should get it.  Please call patient to discuss.    Phone Number Patient can be reached at: Home number on file [phone] (home)    Best Time: Any time    Can we leave a detailed message on this number?: Yes    Call taken on 1/19/2021 at 2:31 PM by Pedro Laird

## 2021-06-15 NOTE — PATIENT INSTRUCTIONS - HE
Please follow up if you have any further issues.    You may contact me by phone or MyChart if you are worsening or if things are not improving.    ______________________________________________________________________     Please remember that you can call [phone] to schedule an appointment.     You can schedule appointments 24 hours a day, 7 days a week.  Sometimes the best time to schedule an appointment is after clinic hours when less people are calling in.  Weekends are another option for calling in to schedule appointments.

## 2021-06-16 PROBLEM — Z79.899 CONTROLLED SUBSTANCE AGREEMENT SIGNED: Chronic | Status: ACTIVE | Noted: 2017-08-16

## 2021-06-16 PROBLEM — F51.01 PRIMARY INSOMNIA: Status: ACTIVE | Noted: 2019-08-16

## 2021-06-16 NOTE — TELEPHONE ENCOUNTER
Please notify the patient that refills were sent.    Denis Frias MD  General Internal Medicine  [facility]  4/19/2021, 8:27 AM

## 2021-06-16 NOTE — TELEPHONE ENCOUNTER
Alternate sent in to pharmacy.      [id number] - [place], [place] - [address] AT [facility] [address]  [address]  [facility] [address]  Phone: [phone] Fax: [phone]

## 2021-06-16 NOTE — TELEPHONE ENCOUNTER
Reason for Call:  Medication or medication refill:    Do you use a [facility]?  Name of the pharmacy and phone number for the current request: [facility]    Name of the medication requested:     Amlodipine 5 mg  Hydrochlorothiazide 25 mg  Lorazepam 0.5 mg    Other request: Patient states that she's nearly out of medication at this time.    Can we leave a detailed message on this number? Yes    Phone number patient can be reached at: Home number on file [phone] (home)    Best Time: Any time    Call taken on 4/19/2021 at 8:04 AM by Pedro Laird

## 2021-06-16 NOTE — TELEPHONE ENCOUNTER
I sent in a prescription for amoxicillin but she should follow up in clinic sooner if she wants to make sure that this is what is going on or if she is not improving.    Denis Frias MD  General Internal Medicine  [facility]  4/19/2021, 4:45 PM

## 2021-06-16 NOTE — TELEPHONE ENCOUNTER
[facility] calling to report of drug interaction.     Pencillin allergy and Amoxicillin was ordered.

## 2021-06-16 NOTE — TELEPHONE ENCOUNTER
"Has anxiety and last few days slight HA.  Has been walking.  Last night bad night of dreams and anxious.    Had episodes of afib.3x in 10 years.  Bp is up the past few days.  Light headed and dizzy feeling.    Needs to be seen today in clinic. Warm transferred to scheduling    Mandy Estrdaa RN [facility] Triage Nurse Advisor    Reason for Disposition    [1] MILD dizziness (e.g., walking normally) AND [2] has NOT been evaluated by physician for this  (Exception: dizziness caused by heat exposure, sudden standing, or poor fluid intake)    Additional Information    Negative: Severe difficulty breathing (e.g., struggling for each breath, speaks in single words)    Negative: [1] Difficulty breathing or swallowing AND [2] started suddenly after medicine, an allergic food or bee sting    Negative: Shock suspected (e.g., cold/pale/clammy skin, too weak to stand, low BP, rapid pulse)    Negative: Difficult to awaken or acting confused (e.g., disoriented, slurred speech)    Negative: [1] Weakness (i.e., paralysis, loss of muscle strength) of the face, arm or leg on one side of the body AND [2] sudden onset AND [3] present now    Negative: [1] Numbness (i.e., loss of sensation) of the face, arm or leg on one side of the body AND [2] sudden onset AND [3] present now    Negative: [1] Loss of speech or garbled speech AND [2] sudden onset AND [3] present now    Negative: Overdose (accidental or intentional) of medications    Negative: [1] Fainted > 15 minutes ago AND [2] still feels too weak or dizzy to stand    Negative: Heart beating < 50 beats per minute OR > 140 beats per minute    Negative: Sounds like a life-threatening emergency to the triager    Negative: Chest pain    Negative: Rectal bleeding, bloody stool, or tarry-black stool    Negative: [1] Vomiting AND [2] contains red blood or black (\"coffee ground\") material    Negative: Vomiting is main symptom    Negative: Diarrhea is main symptom    Negative: Headache is main " "symptom    Negative: Patient states that he/she is having an anxiety/panic attack    Negative: Dizziness from low blood sugar (i.e., < 60 mg/dl or 3.5 mmol/l)    Negative: Dizziness is described as a spinning sensation (i.e., vertigo)    Negative: Heat exhaustion suspected (i.e., dehydration from heat exposure)    Negative: Difficulty breathing    Negative: SEVERE dizziness (e.g., unable to stand, requires support to walk, feels like passing out now)    Negative: Extra heart beats OR irregular heart beating  (i.e., \"palpitations\")    Negative: [1] Drinking very little AND [2] dehydration suspected (e.g., no urine > 12 hours, very dry mouth, very lightheaded)    Negative: Patient sounds very sick or weak to the triager    Negative: [1] Dizziness caused by heat exposure, sudden standing, or poor fluid intake AND [2] no improvement after 2 hours of rest and fluids    Negative: [1] Fever > 103 F (39.4 C) AND [2] not able to get the fever down using Fever Care Advice    Negative: [1] Fever > 101 F (38.3 C) AND [2] age > 60    Negative: [1] Fever > 100.0 F (37.8 C) AND [2] bedridden (e.g., nursing home patient, CVA, chronic illness, recovering from surgery)    Negative: [1] Fever > 100.0 F (37.8 C) AND [2] diabetes mellitus or weak immune system (e.g., HIV positive, cancer chemo, splenectomy, organ transplant, chronic steroids)    Negative: [1] MODERATE dizziness (e.g., interferes with normal activities) AND [2] has NOT been evaluated by physician for this  (Exception: dizziness caused by heat exposure, sudden standing, or poor fluid intake)    Negative: Fever present > 3 days (72 hours)    Negative: Taking a medicine that could cause dizziness (e.g., blood pressure medications, diuretics)    Negative: [1] MODERATE dizziness (e.g., interferes with normal activities) AND [2] has been evaluated by physician for this    Protocols used: DIZZINESS - [id number]      "

## 2021-06-16 NOTE — TELEPHONE ENCOUNTER
Called and realyed message to patient/    She stated that she also left a message on providers phone but wanted to pass message on. States she has had on and off ear fullness, throat feels full. Thinks she has a sinus infection. Has been going on for about 2 weeks now. Wondering if she can get something for this.     Takes allegra that does not seem to be helping. Dizziness and headaches off and on. States provider is aware this happens from time to time.

## 2021-06-17 NOTE — PATIENT INSTRUCTIONS - HE
Patient Instructions by Denis Frias MD at 8/16/2019  8:50 AM     Author: Denis Frias MD Service: -- Author Type: Physician    Filed: 8/16/2019  9:27 AM Encounter Date: 8/16/2019 Status: Addendum    : Denis Frias MD (Physician)    Related Notes: Original Note by Denis Frias MD (Physician) filed at 8/16/2019  9:16 AM       1. You may use melatonin 3-10 mg at night for sleep.  5 mg at night works for most.  2. Blood work is done.  3. Follow up again in 6 months, sooner if issues.    ______________________________________________________________________         Patient Education     Understanding Achilles Tendonitis    Achilles tendonitis is an overuse injury. It results in inflammation of the Achilles tendon. This tendon is found on the back of the ankle. It links the calf muscle to the heel bone. It helps you do pushing-off movements like running or standing on your toes.     How to say it  -[facility]   What causes Achilles tendonitis?  Achilles tendonitis can happen if you do an activity like running, walking, or jumping too much. This overuse can strain, or pull, the tendon. It may lead to minor tearing of the tendon. An injury to the lower leg or foot can also cause it.  If you dont warm up before taking part in sports such as basketball, you are more likely to suffer from this condition. You are also more prone to it if you do too much of such an activity too quickly. Proper training and rest can help prevent it.  Symptoms of Achilles tendonitis  The main symptom of Achilles tendonitis is pain. This pain mostly happens when you move the ankle. The tendon may also feel stiff after a period of no activity, such as sleeping. It may also become swollen. You may hear a crackling sound when you move your ankle.  Treatment for Achilles tendonitis  Symptoms often get better after starting treatment. A full recovery may take several months. Treatments include:    Rest. You  should stop or change the activity that caused the injury. The tendon will then have time to heal.    Cold or heat pack. These help reduce pain and swelling.    Prescription or over-the-counter pain medicines. These help reduce pain and swelling.    Shoe inserts. These devices can reduce strain on the Achilles tendon when you move. You may then feel less pain.    Stretching and strengthening exercises. Certain exercises can help you regain flexibility and strength in your Achilles tendon.    Surgery. This option can fix the injured tendon. But you dont often need it unless other treatments dont work.     When to call your healthcare provider   Call your healthcare provider right away if you have any of these:    Fever of 100.4 F (38 C) or higher, or as directed    Pain that gets worse    Symptoms that dont get better, or get worse    New symptoms    Date Last Reviewed: 3/10/2016    3971-8796 [employer]. [address] [place] [address], [place] [address]. All rights reserved. This information is not intended as a substitute for professional medical care. Always follow your healthcare professional's instructions.

## 2021-06-19 NOTE — LETTER
Letter by Denis Frias MD at      Author: Denis Frias MD Service: -- Author Type: --    Filed:  Encounter Date: 8/16/2019 Status: (Other)         [place] [facility]  08/16/19    Patient: Francesca Jay  YOB: 1935  Medical Record Number: [id number]  CSN: [id number]                                                                MEDICATION: LORAZEPAM  Non-opioid Controlled Substance Agreement    I understand that my care provider has prescribed a controlled substance to help manage my condition(s). I am taking this medicine to help me function or work. I know this is strong medicine, and that it can cause serious side effects. Controlled substances can be sedating, addicting and may cause a dependency on the drug. They can affect my ability to drive or think, and cause depression. They need to be taken exactly as prescribed. Combining controlled substances with certain medicines or chemicals (such as cocaine, sedatives and tranquilizers, sleeping pills, meth) can be dangerous or even fatal. Also, if I stop controlled substances suddenly, I may have severe withdrawal symptoms.  If not helpful, I may be asked to stop them.    The risks, benefits, and side effects of these medicine(s) were explained to me. I agree that:    1. I will take part in other treatments as advised by my care team. This may be psychiatry or counseling, physical therapy, behavioral therapy, group treatment or a referral to a pain clinic. I will reduce or stop my medicine when my care team tells me to do so.  2. I will take my medicines as prescribed. I will not change the dose or schedule unless my care team tells me to. There will be no refills if I run out early.  I may be contactedwithout warning and asked to complete a urine drug test or pill count at any time.   3. I will keep all my appointments, and understand this is part of the monitoring of controlled substances. My care team may require an office visit for  EVERY controlled substance refill. If I miss appointments or dont follow instructions, my care team may stop my medicine.  4. I will not ask other providers to prescribe controlled substances, and I will not accept controlled substances from other people. If I need another prescribed controlled substance for a new reason, I will tell my care team within 1 business day.  5. I will use one pharmacy to fill all of my controlled substance prescriptions, and it is up to me to make sure that I do not run out of my medicines on weekends or holidays. If my care team is willing to refill my controlled substance prescription without a visit, I must request refills only during office hours, refills may take up to 3 days to process, and it may take up to 5 to 7 days for my medicine to be mailed and ready at my pharmacy. Prescriptions will not be mailed anywhere except my pharmacy.    6. I am responsible for my prescriptions. If the medicine/prescription is lost or stolen, it will not be replaced. I also agree not to share controlled substance medicines with anyone.          [place] [facility]  08/16/19  Patient:  Francesca Jay  YOB: 1935  Medical Record Number: [id number]  CSN: [id number]    7. I agree to not use ANY illegal or recreational drugs. This includes marijuana, cocaine, bath salts or other drugs. I agree not to use alcohol unless my care team says I may. I agree to give urine samples whenever asked. If I dont give a urine sample, the care team may stop my medicine.    8. If I enroll in the [place] Medical Marijuana program, I will tell my care team. I will also sign an agreement to share my medical records with my care team.    9. I will bring in my list of medicines (or my medicine bottles) each time I come to the clinic.   10. I will tell my care team right away if I become pregnant or have a new medical problem treated outside of my regular clinic.  11. I understand that this medicine can  affect my thinking and judgment. It may be unsafe for me to drive, use machinery and do dangerous tasks. I will not do any of these things until I know how the medicine affects me. If my dose changes, I will wait to see how it affects me. I will contact my care team if I have concerns about medicine side effects.    I understand that if I do not follow any of the conditions above, my prescriptions or treatment may be stopped.      I agree that my provider, clinic care team, and pharmacy may work with any city, state or federal law enforcement agency that investigates the misuse, sale, or other diversion of my controlled medicine. I will allow my provider to discuss my care with or share a copy of this agreement with any other treating provider, pharmacy or emergency room where I receive care. I agree to give up (waive) any right of privacy or confidentiality with respect to these consents.   I have read this agreement and have asked questions about anything I did not understand.    ___________________________________________________________________________  Patient signature - Date/Time  -Francesca Jay                                      ___________________________________________________________________________  Witness signature                                                                    ___________________________________________________________________________  Provider signature- Denis Frias MD

## 2021-06-19 NOTE — LETTER
Letter by Denis Frias MD at      Author: Denis Frias MD Service: -- Author Type: --    Filed:  Encounter Date: 8/16/2019 Status: (Other)         8/16/2019    Francesca Jay   [address] [address] [place] [address]         Dear Francesca,    It was good to see you in clinic.  I hope your questions were answered at the time of your visit.    The results of your tests from your visit were as follows:    Resulted Orders   Glycosylated Hemoglobin A1c   Result Value Ref Range    Hemoglobin A1c 6.3 (H) 3.5 - 6.0 %   Basic Metabolic Panel   Result Value Ref Range    Sodium 141 136 - 145 mmol/L    Potassium 4.4 3.5 - 5.0 mmol/L    Chloride 106 98 - 107 mmol/L    CO2 25 22 - 31 mmol/L    Anion Gap, Calculation 10 5 - 18 mmol/L    Glucose 103 70 - 125 mg/dL    Calcium 10.1 8.5 - 10.5 mg/dL    BUN 12 8 - 28 mg/dL    Creatinine 0.71 0.60 - 1.10 mg/dL    GFR MDRD Af Amer >60 >60 mL/min/1.73m2    GFR MDRD Non Af Amer >60 >60 mL/min/1.73m2    Narrative    Fasting Glucose reference range is 70-99 mg/dL per  American Diabetes Association (ADA) guidelines.   BNP(B-type Natriuretic Peptide)   Result Value Ref Range    BNP 26 0 - 163 pg/mL   HM2(CBC w/o Differential)   Result Value Ref Range    WBC 5.1 4.0 - 11.0 thou/uL    RBC 5.33 3.80 - 5.40 mill/uL    Hemoglobin 13.6 12.0 - 16.0 g/dL    Hematocrit 41.5 35.0 - 47.0 %    MCV 78 (L) 80 - 100 fL    MCH 25.5 (L) 27.0 - 34.0 pg    MCHC 32.8 32.0 - 36.0 g/dL    RDW 13.9 11.0 - 14.5 %    Platelets 235 140 - 440 thou/uL    MPV 8.7 7.0 - 10.0 fL     Your blood counts are normal and you are not anemic.     Your heart tests were very good.     Your electrolytes were normal.  Your kidney tests were normal.      Your blood sugar is slightly high and you are at risk for developing diabetes.  Please work on preventing weight gain and lose weight if able.    Please follow up again in 6 months, sooner if issues.    If you have any questions regarding these results, please feel free to  contact me at [phone].  I wish you the best of health!        Sincerely,      Denis Frias MD  General Internal Medicine  [facility]

## 2021-06-20 NOTE — LETTER
Letter by Denis Frias MD at      Author: Denis Frias MD Service: -- Author Type: --    Filed:  Encounter Date: 8/19/2020 Status: (Other)         8/19/2020    Francesca Jay   [address] [address] [place] [address]         Dear Francesca,    It was good to see you in clinic.  I hope your questions were answered at the time of your visit.    The results of your tests from your visit were as follows:    Resulted Orders   HM2(CBC w/o Differential)   Result Value Ref Range    WBC 4.7 4.0 - 11.0 thou/uL    RBC 5.05 3.80 - 5.40 mill/uL    Hemoglobin 13.6 12.0 - 16.0 g/dL    Hematocrit 41.1 35.0 - 47.0 %    MCV 81 80 - 100 fL    MCH 26.8 (L) 27.0 - 34.0 pg    MCHC 33.0 32.0 - 36.0 g/dL    RDW 13.1 11.0 - 14.5 %    Platelets 205 140 - 440 thou/uL    MPV 10.0 7.0 - 10.0 fL   Basic Metabolic Panel   Result Value Ref Range    Sodium 143 136 - 145 mmol/L    Potassium 4.3 3.5 - 5.0 mmol/L    Chloride 106 98 - 107 mmol/L    CO2 28 22 - 31 mmol/L    Anion Gap, Calculation 9 5 - 18 mmol/L    Glucose 102 70 - 125 mg/dL    Calcium 9.5 8.5 - 10.5 mg/dL    BUN 16 8 - 28 mg/dL    Creatinine 0.72 0.60 - 1.10 mg/dL    GFR MDRD Af Amer >60 >60 mL/min/1.73m2    GFR MDRD Non Af Amer >60 >60 mL/min/1.73m2    Narrative    Fasting Glucose reference range is 70-99 mg/dL per  American Diabetes Association (ADA) guidelines.   Glycosylated Hemoglobin A1c   Result Value Ref Range    Hemoglobin A1c 6.0 (H) <=5.6 %      Comment:      Normal <5.7% Prediabete 5.7-6.4% Diabletes 6.5% or higher - adopted from ADA consensus guidelines     Your kidney tests are normal.  Your electrolytes are also normal.  There is no signs of diabetes.     Your blood counts are normal and you are not anemic.     Please follow up again in 6 months, sooner if issues.    If you have any questions regarding these results, please feel free to contact me at [phone].  I wish you the best of health!        Sincerely,      Denis Frias MD  General Internal  [facility]

## 2021-06-20 NOTE — LETTER
Letter by Denis Frias MD at      Author: Denis Frias MD Service: -- Author Type: --    Filed:  Encounter Date: 8/18/2020 Status: (Other)         [place] [facility]  08/18/20    Patient: Francesca Jay  YOB: 1935  Medical Record Number: [id number]  CSN: [id number]                                                                MEDICATION: LORAZEPAM  Non-opioid Controlled Substance Agreement    I understand that my care provider has prescribed a controlled substance to help manage my condition(s). I am taking this medicine to help me function or work. I know this is strong medicine, and that it can cause serious side effects. Controlled substances can be sedating, addicting and may cause a dependency on the drug. They can affect my ability to drive or think, and cause depression. They need to be taken exactly as prescribed. Combining controlled substances with certain medicines or chemicals (such as cocaine, sedatives and tranquilizers, sleeping pills, meth) can be dangerous or even fatal. Also, if I stop controlled substances suddenly, I may have severe withdrawal symptoms.  If not helpful, I may be asked to stop them.    The risks, benefits, and side effects of these medicine(s) were explained to me. I agree that:    1. I will take part in other treatments as advised by my care team. This may be psychiatry or counseling, physical therapy, behavioral therapy, group treatment or a referral to a pain clinic. I will reduce or stop my medicine when my care team tells me to do so.  2. I will take my medicines as prescribed. I will not change the dose or schedule unless my care team tells me to. There will be no refills if I run out early.  I may be contactedwithout warning and asked to complete a urine drug test or pill count at any time.   3. I will keep all my appointments, and understand this is part of the monitoring of controlled substances. My care team may require an office visit for  EVERY controlled substance refill. If I miss appointments or dont follow instructions, my care team may stop my medicine.  4. I will not ask other providers to prescribe controlled substances, and I will not accept controlled substances from other people. If I need another prescribed controlled substance for a new reason, I will tell my care team within 1 business day.  5. I will use one pharmacy to fill all of my controlled substance prescriptions, and it is up to me to make sure that I do not run out of my medicines on weekends or holidays. If my care team is willing to refill my controlled substance prescription without a visit, I must request refills only during office hours, refills may take up to 3 days to process, and it may take up to 5 to 7 days for my medicine to be mailed and ready at my pharmacy. Prescriptions will not be mailed anywhere except my pharmacy.    6. I am responsible for my prescriptions. If the medicine/prescription is lost or stolen, it will not be replaced. I also agree not to share controlled substance medicines with anyone.          [place] [facility]  08/18/20  Patient:  Francesca Jay  YOB: 1935  Medical Record Number: [id number]  CSN: [id number]    7. I agree to not use ANY illegal or recreational drugs. This includes marijuana, cocaine, bath salts or other drugs. I agree not to use alcohol unless my care team says I may. I agree to give urine samples whenever asked. If I dont give a urine sample, the care team may stop my medicine.    8. If I enroll in the [place] Medical Marijuana program, I will tell my care team. I will also sign an agreement to share my medical records with my care team.    9. I will bring in my list of medicines (or my medicine bottles) each time I come to the clinic.   10. I will tell my care team right away if I become pregnant or have a new medical problem treated outside of my regular clinic.  11. I understand that this medicine can  affect my thinking and judgment. It may be unsafe for me to drive, use machinery and do dangerous tasks. I will not do any of these things until I know how the medicine affects me. If my dose changes, I will wait to see how it affects me. I will contact my care team if I have concerns about medicine side effects.    I understand that if I do not follow any of the conditions above, my prescriptions or treatment may be stopped.      I agree that my provider, clinic care team, and pharmacy may work with any city, state or federal law enforcement agency that investigates the misuse, sale, or other diversion of my controlled medicine. I will allow my provider to discuss my care with or share a copy of this agreement with any other treating provider, pharmacy or emergency room where I receive care. I agree to give up (waive) any right of privacy or confidentiality with respect to these consents.   I have read this agreement and have asked questions about anything I did not understand.    ___________________________________________________________________________  Patient signature - Date/Time  -Francesca Jay                                      ___________________________________________________________________________  Witness signature                                                                    ___________________________________________________________________________  Provider signature- Denis Frias MD

## 2021-06-24 NOTE — TELEPHONE ENCOUNTER
Patient notified and is agreeable. No further questions.     Keiko Singh RN Triage Care Connection

## 2021-06-24 NOTE — TELEPHONE ENCOUNTER
Please call patient -    ______________________________________________________________________     Home phone:  [phone] (home)     Cell phone:   Telephone Information:   Mobile [phone]       Other contacts:  Name Home Phone Work Phone Mobile Phone Relationship Lgl Gravery CAICEDOSASHA GREEN   [phone] Child    MIC MCKINNEY   [phone] Spouse      ______________________________________________________________________     I did send in a prescription for doxycycline to   [facility] -[address] [address]  Phone: [phone] Fax: [phone]      This will be for 7 days.    She can be seen in follow up next Tuesday in a reserved slot if needed for this.  May schedule the patient for this if she wishes.    Denis Frias MD  [facility]  2/15/2019, 1:06 PM

## 2021-06-24 NOTE — TELEPHONE ENCOUNTER
"Patient called and states she has had \"the crud\" for 11 days.     Patient states she did go to [facility] and was tested for strep which was neg    Feels like a sinus infection, has had many so she \"knows that it is a sinus infection\"    Rates total discomfort at a 9/10    Cough with clear mucus  Sore/scratchy throat  Runny/stuffy nose with yellow discharge  Facial pain   Ears are full  Intermittent shortness of breath due to cough  Under eyes is puffy and tender  Headache 3/10 today but has been worse    No fever  Cannot use Neti Pot    Taking mucinex and cough syrup and this is not getting better at all  Using humidifier  Using Tea with honey and lemon    Triaged to a disposition of Go to Office now. Patient states she does not have a way to the clinic. Home care advice given.     Patient wants an antibiotic called to her pharmacy.     Okay to leave a detailed message on voicemail.     Pharmacy verified.     Reason for Disposition    SEVERE sinus pain    Protocols used: SINUS PAIN AND CONGESTION-A-OH    Keiko Singh RN Triage Care Connection    "

## 2021-06-24 NOTE — TELEPHONE ENCOUNTER
Medication Question or Clarification  Who is calling: Patient  What medication are you calling about?: doxycycline  What dose do you take?: 100 mg  How often are you taking the medication?: two times a day  Who prescribed the medication?: Dr Frias  What is your question/concern?: Pharmacy is telling patient they did not get the order.  Writer called and spoke to Louis the pharmacist.  Telephone order was given.  No follow up needed.   Pharmacy: [facility]  Okay to leave a detailed message?: No call back needed.   Site CMT - Please call the pharmacy to obtain any additional needed information.

## 2021-06-24 NOTE — PATIENT INSTRUCTIONS - HE
The new shingles shot (Shingrix) is 2 separate shots  by 2-6 months.    The cost out of pocket is around $390 for the 2 shots, so you might want to see if your insurance covers it or a portion of it prior to having it done.  Often by having it done at a pharmacy rather than a clinic, it can be cheaper for you.    It is estimated that any person's risk of shingles over their lifetime is around 10-20%.    The old shingles vaccine (Zostavax) is about 50% effective, reducing your risk of shingles to about 5-10% over your lifetime.    The new shot is 90% effective, reducing your risk of shingles to about 1-2% over your lifetime.    Because the shot is strong, it is very common to have flu like symptoms for 2-3 days after the shot.  25-50% of patients will have fever, muscle aches or headache.    I believe that whether or not you have this vaccine is your own decision depending on your values and preferences.  The information above I give you to make an informed decision.    Denis Frias MD  [facility]

## 2021-06-25 NOTE — PROGRESS NOTES
Progress Notes by Denis Frias MD at 8/15/2017  9:15 AM     Author: Denis Frias MD Service: -- Author Type: Physician    Filed: 8/17/2017  8:34 AM Encounter Date: 8/15/2017 Status: Signed    : Denis Frias MD (Physician)              [facility]    Comprehensive and complex medical care - Chronic disease management - Shared decision making - Care coordination - Compassionate care    Patient advocacy - Rational deprescribing - Minimally disruptive medicine - Ethical focus - Customized care    Date of Service: 8/15/2017  Primary Provider: Denis Frias MD    Patient Care Team:  Denis Frias MD as PCP - General (Internal Medicine)  Basim Rivera MD (Ophthalmology)  Tami De La Torre DC (Chiropractic Medicine)     ______________________________________________________________________     Patient's Pharmacy:    [id number] ([place]) - [place], [place] - [address] AT [facility] [address]   [address]  [facility] [address]  Phone: [phone] Fax: [phone]     Patient's Insurance:    Payor: MEDICARE / Plan: MEDICARE A AND B / Product Type: Medicare /     ______________________________________________________________________     Francesca Jay is 82 y.o. female who comes in today for:    Chief Complaint   Patient presents with   ? Follow-up     MEDICATION CHECK.   ? Follow-up     went to ER in [place] for being lightheaded and shaky       Patient Active Problem List   Diagnosis   ? HTN (hypertension)   ? Anxiety   ? BPV (benign positional vertigo)   ? AR (allergic rhinitis)   ? Macular degeneration (senile) of retina   ? RAHEEL (obstructive sleep apnea) - CPAP 9 cm H2O   ? OA (osteoarthritis) - hands and feet   ? Paroxysmal atrial fibrillation - 2 short lived episodes in the past - Chooses aspirin for anticoagulation   ? CHADS2 score is 2 which confers and estimated risk of stroke of 2.5% without coumadin and 1.2%  with coumadin. The NNT to prevent 1 stroke a in year is 81   ? HASBLED score is 2 which translates to a risk of 1.88% of major bleeding in 1 year.   ? Nonsmoker   ? Full code status   ? Controlled substance agreement signed - 8/17 - Lorazepam (intermittent use)      Current Outpatient Prescriptions   Medication Sig Note   ? amLODIPine (NORVASC) 5 MG tablet Take 1 tablet (5 mg total) by mouth 2 (two) times a day.    ? aspirin 325 MG tablet Take 325 mg by mouth. 5/14/2015: Received from: [facility]   ? cholecalciferol, vitamin D3, 5,000 unit Tab Take by mouth.    ? hydroCHLOROthiazide (HYDRODIURIL) 25 MG tablet Take 1 tablet (25 mg total) by mouth daily.    ? LORazepam (ATIVAN) 0.5 MG tablet TAKE 1 TABLET BY MOUTH EVERY SIX HOURS AS NEEDED FOR ANXIETY    ? magnesium 100 mg cap  5/14/2015: Received from: [facility]   ? OMEGA-3 FATTY ACIDS ORAL Take 2 g by mouth. 5/14/2015: Received from: [facility]   ? UNABLE TO FIND Med Name: OTC  Presen- Vision Areds 2 Formula      Social History     Social History Narrative    Patient of Dr. Frias since 2005.         .   has severe COPD.        Daughter Kaila is a RN at [employer] in [place].        Sister in law is Radhika Odonnell, another patient of mine.     ______________________________________________________________________     History of present illness:    The patient comes in today for a number of medical issues.  She is also following up emergency room visit.  This is reviewed per care everywhere.    We first reviewed some jaw tightness she was having.  She was at [employer] shopping a large amount of spicy ribs when this occurred.  She was just standing at that time.  She had eaten prior to this.  She felt some tightness in her jaw and felt a little weak around 5 PM.  She had pain across her back.  Her face was bright red and flushed.  She was a little nauseated at the time as well.  She felt a little lightheaded.  She then developed  it more in her neck and chest with tightness.  The whole episode lasted about 10-12 minutes.  She drove herself to the emergency room.  Workup there was negative.  She has had a stress test in the past which was negative.  She has had an angiogram in the past and this was reviewed as well.  It showed minimal findings but was quite a while ago.  She does not want to do anything further with this at this point.  She has no stable anginal symptoms at this point.  She did not have A. fib when she went into the emergency room either.    We reviewed her high blood pressure.  Her blood pressures generally been stable overall.  She does get spikes once in a while, but they are not severe.  She denies any excessive swelling in her legs.    We reviewed her anxiety and she still uses 1-2 Lorazepam a week generally for this.  We did do a controlled substance agreement today.  She has used this medication very appropriately.  We will consider urine drug screen in the future.    We reviewed her obstructive sleep apnea and this is doing well.    In relationship to her left leg numbness she still has it intermittently, but not as severe as previously.  We reviewed this with her today.  She does not want any further workup for this.    On review of systems, the patient denies any chest pain or shortness of breath.    ______________________________________________________________________    Exam:    Wt Readings from Last 3 Encounters:   08/15/17 (!) 238 lb 3.2 oz (108 kg)   03/10/17 (!) 237 lb 9.6 oz (107.8 kg)   06/23/16 (!) 231 lb 12.8 oz (105.1 kg)     BP Readings from Last 3 Encounters:   08/15/17 140/70   03/12/17 136/64   09/27/16 136/68     /70  Pulse (!) 57  Wt (!) 238 lb 3.2 oz (108 kg)  SpO2 97%  BMI 40.26 kg/m2    The patient is comfortable, no acute distress.  Mood good.  Insight good.  Eyes are nonicteric.  Neck is supple without mass.  No cervical adenopathy.  No thyromegaly. Heart regular rate and rhythm.   Lungs clear to auscultation bilaterally.  Respiratory effort is good.  Abdomen soft and nontender.  No hepatosplenomegaly.  Extremities no edema.      ______________________________________________________________________    Diagnostics:    Results for orders placed or performed in visit on 03/10/17   Basic Metabolic Panel   Result Value Ref Range    Sodium 141 136 - 145 mmol/L    Potassium 4.4 3.5 - 5.0 mmol/L    Chloride 107 98 - 107 mmol/L    CO2 29 22 - 31 mmol/L    Anion Gap, Calculation 5 5 - 18 mmol/L    Glucose 100 70 - 125 mg/dL    Calcium 9.5 8.5 - 10.5 mg/dL    BUN 16 8 - 28 mg/dL    Creatinine 0.75 0.60 - 1.10 mg/dL    GFR MDRD Af Amer >60 >60 mL/min/1.73m2    GFR MDRD Non Af Amer >60 >60 mL/min/1.73m2   HM2(CBC w/o Differential)   Result Value Ref Range    WBC 6.3 4.0 - 11.0 thou/uL    RBC 5.42 (H) 3.80 - 5.40 mill/uL    Hemoglobin 13.6 12.0 - 16.0 g/dL    Hematocrit 41.3 35.0 - 47.0 %    MCV 76 (L) 80 - 100 fL    MCH 25.0 (L) 27.0 - 34.0 pg    MCHC 32.8 32.0 - 36.0 g/dL    RDW 14.2 11.0 - 14.5 %    Platelets 244 140 - 440 thou/uL    MPV 8.7 7.0 - 10.0 fL     Outside blood work and EKG were reviewed.  ______________________________________________________________________    Assessment:    1. Jaw pain    2. HTN (hypertension)    3. RAHEEL (obstructive sleep apnea) - CPAP 9 cm H2O    4. Anxiety    5. Left leg numbness    6. Controlled substance agreement signed - 8/17 - Lorazepam (intermittent use)       ______________________________________________________________________     No results found for: LDLCALC    PHQ-2 Total Score: 1 (8/15/2017  9:00 AM)  No Data Recorded      Plan:    1. Discussed further testing for jaw pain including nuclear testing for stress testing, but the patient declines at this time and would want to follow her symptoms at this point.  This can be ordered in the future.  She would likely need a chemical nuclear test.  2. Anxiety could be a component of this or possibly reflux as well.   However cannot exclude heart.  No baseline anginal symptoms.  3. High blood pressure currently doing okay and no changes in medication is required.  4. Consider further workup of the back in the future if needed.  5. Controlled substance agreement done today.  6. Consider drugs of abuse screen at next visit.       Denis Frias MD  General Internal Medicine  [facility]     Personal office fax - [phone]  Voice mail - [phone]  E-mail - [email or web address]    Return in about 6 months (around 2/15/2018) for visit and blood work.

## 2021-06-25 NOTE — PROGRESS NOTES
Progress Notes by Denis Frias MD at 3/10/2017  9:15 AM     Author: Denis Frias MD Service: -- Author Type: Physician    Filed: 3/17/2017 10:06 AM Encounter Date: 3/10/2017 Status: Addendum    : Denis Frias MD (Physician)    Related Notes: Original Note by Denis Frias MD (Physician) filed at 3/12/2017  9:39 PM       A copy of this note is sent to the patient's previous primary clinic to make them aware that I have assumed the primary care role for this patient.    PLEASE UPDATE PRIMARY PHYSICIAN to me (Dr. Denis Frias) SO THAT I MAY RECEIVE RECORDS WHEN THE PATIENT IS SEEN TO ENHANCE CARE COORDINATION.      Please do so because it is in the patient's best interest.  Thank you!     ______________________________________________________________________          [facility]    Comprehensive and complex medical care - Chronic disease management - Shared decision making - Care coordination - Compassionate care    Patient advocacy - Rational deprescribing - Minimally disruptive medicine - Ethical focus - Customized care    Date of Service: 3/10/2017  Primary Provider: Denis Frias MD    Patient Care Team:  Denis Frias MD as PCP - General (Internal Medicine)  Basim Rivera MD (Ophthalmology)  Tami De La Torre DC (Chiropractic Medicine)     ______________________________________________________________________     Patient's Pharmacy:    [id number] ([place]) - [facility], [place] - [address] AT [facility] [address]  [address]  [facility] [address]  Phone: [phone] Fax: [phone]     Patient's Insurance:    Payor: MEDICARE / Plan: MEDICARE A AND B / Product Type: Medicare /     ______________________________________________________________________     Francesca Jay is 82 y.o. female who comes in today for:    Chief Complaint   Patient presents with   ? Follow-up   ? Hypertension       Patient Active  Problem List   Diagnosis   ? HTN (hypertension)   ? Anxiety   ? BPV (benign positional vertigo)   ? AR (allergic rhinitis)   ? Macular degeneration (senile) of retina   ? RAHEEL (obstructive sleep apnea) - CPAP 9 cm H2O   ? OA (osteoarthritis) - hands and feet   ? Paroxysmal atrial fibrillation - 2 short lived episodes in the past - Chooses aspirin for anticoagulation   ? CHADS2 score is 2 which confers and estimated risk of stroke of 2.5% without coumadin and 1.2% with coumadin. The NNT to prevent 1 stroke a in year is 81   ? HASBLED score is 2 which translates to a risk of 1.88% of major bleeding in 1 year.   ? Nonsmoker   ? Full code status     Current Outpatient Prescriptions   Medication Sig Note   ? amLODIPine (NORVASC) 5 MG tablet Take 1 tablet (5 mg total) by mouth 2 (two) times a day.    ? aspirin 325 MG tablet Take 325 mg by mouth. 5/14/2015: Received from: [facility]   ? cholecalciferol, vitamin D3, 5,000 unit Tab Take by mouth.    ? fluticasone (FLONASE) 50 mcg/actuation nasal spray 2 sprays into each nostril daily.    ? hydroCHLOROthiazide (HYDRODIURIL) 25 MG tablet Take 1 tablet (25 mg total) by mouth daily.    ? LORazepam (ATIVAN) 0.5 MG tablet TAKE 1 TABLET BY MOUTH EVERY SIX HOURS AS NEEDED FOR ANXIETY    ? magnesium 100 mg cap  5/14/2015: Received from: [facility]   ? OMEGA-3 FATTY ACIDS ORAL Take 2 g by mouth. 5/14/2015: Received from: [facility]   ? UNABLE TO FIND Med Name: OTC  Presen- Vision Areds 2 Formula      Social History     Social History Narrative    Patient of Dr. Frias since 2005.         .   has severe COPD.        Daughter Kaila is a RN at [employer] in [place].        Sister in law is Radhika Odonnell, another patient of mine.     Family History   Problem Relation Age of Onset   ? Alcohol abuse Mother    ? Liver disease Mother    ? Alcohol abuse Father    ? Colon cancer Brother    ? Lung cancer Brother    ? Lung disease Brother    ? OCD Son    ?  Depression Son       ______________________________________________________________________     History of present illness:    The patient comes in today for a number of medical issues.     We first reviewed her high blood pressure.  Her blood pressure initially has been chronic.  On recheck it was in range.  Her outpatient blood pressures have been doing very well.  Her blood pressure cuff has correlated with the blood pressure.  She has not had any issues with this.  Denies any chest pain or shortness of breath.    She has a skin lesion on her nose that her daughter is concerned about.  Her daughter is a nurse.  It has not changed on her and she has previously had looked at.  There is no bleeding from it or otherwise.    We reviewed her CODE STATUS with her today.  She does want a limited trial of resuscitation, but would not want to remain on life support.  Her daughter knows her wishes.    Reviewed Prevnar vaccine with the patient today and its general lack of help.  She declines having this done today.    Her osteoarthritis of her hands and feet are stable at this time.  She has problems with neck stiffness as well.    We reviewed her anxiety and this is stable.  She uses lorazepam as needed and still works pretty well for her overall.    Her sleep apnea is doing well without issue.    She does have some skin tags which she may eventually have something done with.    She does get some pains in her right hand which is like a hot knife occasionally periods in her right palm near her thumb.  She denies any numbness in her fingers.  It is intermittent and infrequent and she doesn't want to do anything more with it yet at this time.    She does feel tired and attributes this to age.  She denies any shortness of breath or any other changes.    We reviewed her left leg pain.  She does get a hot stabbing pain when walking or standing for a period of time over the lateral leg going down towards the knee.  She denies any  numbness or loss of function.  This is intermittent as well.  She has seen a chiropractor for this as well.    The 14-system review of systems form for [facility] is scanned into the chart.     ______________________________________________________________________    Exam:    Wt Readings from Last 3 Encounters:   03/10/17 (!) 237 lb 9.6 oz (107.8 kg)   06/23/16 (!) 231 lb 12.8 oz (105.1 kg)   04/08/16 (!) 234 lb (106.1 kg)     BP Readings from Last 3 Encounters:   03/12/17 136/64   09/27/16 136/68   06/23/16 140/88     Visit Vitals   ? /64   ? Pulse (!) 57   ? Wt (!) 237 lb 9.6 oz (107.8 kg)   ? BMI 40.15 kg/m2      The patient is comfortable, no acute distress.  Mood good.  Insight is good.  No skin lesions or nodules of concern.   She does have sebaceous hyperplasia of a single area of the nose without changes of malignancy.  She does have multiple skin tags as well.  Ears clear.  Eyes are nonicteric.  Pupils equal and reactive.  Throat is clear.  Neck is supple without mass, no thyromegaly. No cervical or epitrochlear adenopathy.  Heart regular rate and rhythm.   She does have a 1 to 2/6 systolic murmur at the base of her heart.   Lungs clear to auscultation bilaterally.  Respiratory effort good.  Abdomen soft and nontender.  No hepatosplenomegaly.  Extremities show trace edema.   There is no difference between her arms as far as blood pressure readings go.  She has degenerative changes in her hands and feet consistent with osteoarthritis.       ______________________________________________________________________    Diagnostics:    Results for orders placed or performed in visit on 03/10/17   Basic Metabolic Panel   Result Value Ref Range    Sodium 141 136 - 145 mmol/L    Potassium 4.4 3.5 - 5.0 mmol/L    Chloride 107 98 - 107 mmol/L    CO2 29 22 - 31 mmol/L    Anion Gap, Calculation 5 5 - 18 mmol/L    Glucose 100 70 - 125 mg/dL    Calcium 9.5 8.5 - 10.5 mg/dL     BUN 16 8 - 28 mg/dL    Creatinine 0.75 0.60 - 1.10 mg/dL    GFR MDRD Af Amer >60 >60 mL/min/1.73m2    GFR MDRD Non Af Amer >60 >60 mL/min/1.73m2   HM2(CBC w/o Differential)   Result Value Ref Range    WBC 6.3 4.0 - 11.0 thou/uL    RBC 5.42 (H) 3.80 - 5.40 mill/uL    Hemoglobin 13.6 12.0 - 16.0 g/dL    Hematocrit 41.3 35.0 - 47.0 %    MCV 76 (L) 80 - 100 fL    MCH 25.0 (L) 27.0 - 34.0 pg    MCHC 32.8 32.0 - 36.0 g/dL    RDW 14.2 11.0 - 14.5 %    Platelets 244 140 - 440 thou/uL    MPV 8.7 7.0 - 10.0 fL      ______________________________________________________________________    Assessment:    1. HTN (hypertension)    2. Anxiety    3. Sebaceous hyperplasia    4. Paroxysmal atrial fibrillation - 2 short lived episodes in the past - Chooses aspirin for anticoagulation    5. Nonsmoker    6. AR (allergic rhinitis)    7. RAHEEL (obstructive sleep apnea) - CPAP 9 cm H2O    8. Full code status    9. Fatigue       ______________________________________________________________________      PHQ-2 Total Score: 2 (6/23/2016 11:00 AM)  No Data Recorded    Plan:    1. Follow-up again in 6 months.  2. Could see dermatology for her nose if worsens, but reassured at this time and she does not want to see dermatology.  3. She likely has some radiculopathy in her left leg and may need MRI of the back in the future.  She likely has some carpal tunnel in the right hand.  4. She can have her skin tags removed in the future if needed.  5. Continue current blood pressure medication.  6. Consider medications for her mood in the future, but she doesn't want this at this time.  7. Continue when necessary lorazepam.  Obstructive sleep apnea is doing well without any CPAP problems and no excessive somnolence.  Obstructive sleep apnea is doing well without any CPAP problems and no excessive somnolence.  The patient continues to use CPAP and continues to benefit from it.       Denis Frias MD  General Internal Medicine  [facility]  Clinic     Return in about 6 months (around 9/10/2017) for follow up visit.

## 2021-06-26 NOTE — PROGRESS NOTES
Progress Notes by Denis Frias MD at 8/31/2018 10:05 AM     Author: Denis Frias MD Service: -- Author Type: Physician    Filed: 9/2/2018 10:26 PM Encounter Date: 8/31/2018 Status: Signed    : Denis Frias MD (Physician)              [facility]    Comprehensive and complex medical care - Chronic disease management - Shared decision making - Care coordination - Compassionate care    Patient advocacy - Rational deprescribing - Minimally disruptive medicine - Ethical focus - Customized care    ______________________________________________________________________     Date of Service: 8/31/2018  Primary Provider: Denis Frias MD    Patient Care Team:  Denis Frias MD as PCP - General (Internal Medicine)  Basim Rivera MD (Ophthalmology)  Tami De La Torre DC (Chiropractic Medicine)     ______________________________________________________________________     Patient's Pharmacy:    [facility] -[address] [address]  Phone: [phone] Fax: [phone]     Patient's Contacts:  Name Home Phone Work Phone Mobile Phone Relationship Lg SASHA Hodges   [phone] Child    MIC [place]CK[place]   [phone] Spouse      Patient's Insurance:    Payor: MEDICARE / Plan: MEDICARE A AND B / Product Type: Medicare /     ______________________________________________________________________     Francesca [place] is 83 y.o. female who comes in today for:    Chief Complaint   Patient presents with   ? Follow-up     SLEEP APNEA, ANXIETY, STILL HAS HIP/KNEE PAIN   ? Medication Refill     LORAZEPAM       Patient Active Problem List   Diagnosis   ? HTN (hypertension)   ? Anxiety   ? BPV (benign positional vertigo)   ? AR (allergic rhinitis)   ? Macular degeneration (senile) of retina   ? RAHEEL (obstructive sleep apnea) - CPAP 9 cm H2O   ? OA (osteoarthritis) - hands and feet   ? Paroxysmal  atrial fibrillation (H) - 2 short lived episodes in the past - Chooses aspirin for anticoagulation   ? Nonsmoker   ? Full code status   ? Controlled substance agreement signed - 8/18 - Lorazepam (intermittent use) - UDS 2/18   ? Morbid obesity (H)     Current Outpatient Prescriptions   Medication Sig Note   ? aspirin 325 MG tablet Take 325 mg by mouth. 5/14/2015: Received from: [facility]   ? cholecalciferol, vitamin D3, 5,000 unit Tab Take by mouth.    ? fluticasone (FLONASE) 50 mcg/actuation nasal spray 2 sprays into each nostril daily.    ? LORazepam (ATIVAN) 0.5 MG tablet Take 1 tablet (0.5 mg total) by mouth every 6 (six) hours as needed for anxiety. Please keep this Rx on file for the next RF.    ? magnesium 100 mg cap  5/14/2015: Received from: [facility]   ? OMEGA-3 FATTY ACIDS ORAL Take 2 g by mouth. 5/14/2015: Received from: [facility]   ? UNABLE TO FIND Med Name: OTC  Presen- Vision Areds 2 Formula    ? amLODIPine (NORVASC) 5 MG tablet Take 1 tablet (5 mg total) by mouth 2 (two) times a day.    ? hydroCHLOROthiazide (HYDRODIURIL) 25 MG tablet Take 1 tablet (25 mg total) by mouth daily.      Social History     Social History Narrative    Patient of Dr. Frias since 2005.         .   has severe COPD.        Daughter Kaila is a RN at [employer] in [place].        Sister in law is Radhika Odonnell, another patient of mine.     ______________________________________________________________________     History of present illness:    Patient comes in today for a number of medical issues.    We reviewed her condition and her recent cataract surgery was some help with her night driving, but not a whole lot of additional help.  She has macular degeneration on both sides now.  This is disturbing to her understandably.    We reviewed her blood pressure.  He has been doing well as an outpatient without issues.    We updated her controlled substance agreement.  She is on lorazepam  "for anxiety.  This continues to help her.  She uses it only when needed.    She does continue to have issues with left leg pain.  She identifies it over her left knee and hip.  It is worse at night.  She does not it is also in her left buttock walk too far due to the pain.  Left lateral hip and left lateral knee.  Aleve can help this sometimes.  He can become so bad at times that it feels like it is \"on fire.\"  She denies any problems with laying on her side.  She denies any numbness down the leg and denies any muscle weakness.  She has no difficulty with putting her ankle on her opposite knee.    We reviewed her other issues noted in the assessment but not specifically addressed in the HPI above.     On review of systems, the patient denies any chest pain or shortness of breath.    ______________________________________________________________________    Exam:    Wt Readings from Last 3 Encounters:   04/27/18 (!) 241 lb (109.3 kg)   02/27/18 (!) 236 lb 1.6 oz (107.1 kg)   08/15/17 (!) 238 lb 3.2 oz (108 kg)     BP Readings from Last 3 Encounters:   08/31/18 132/74   04/27/18 128/60   02/27/18 132/74     /74  Pulse 68  SpO2 97%   The patient is comfortable, no acute distress.  Mood good.  Insight good.  Eyes are nonicteric.  Neck is supple without mass.  No cervical adenopathy.  No thyromegaly. Heart regular rate and rhythm.  Lungs clear to auscultation bilaterally.  Respiratory effort is good.  Abdomen soft and nontender.  No hepatosplenomegaly.  Extremities no edema.  She walks with a slight limp.  Her Bulmaro's maneuver on the left is normal without significant hip pain.  Her left knee shows no signs of effusion.  Range of motion is overall good.  There is no back tightness noted.  Her reflexes are equal.      ______________________________________________________________________    Diagnostics:    Results for orders placed or performed in visit on 04/27/18   Basic Metabolic Panel   Result Value Ref Range "    Sodium 142 136 - 145 mmol/L    Potassium 3.9 3.5 - 5.0 mmol/L    Chloride 108 (H) 98 - 107 mmol/L    CO2 25 22 - 31 mmol/L    Anion Gap, Calculation 9 5 - 18 mmol/L    Glucose 143 (H) 70 - 125 mg/dL    Calcium 10.0 8.5 - 10.5 mg/dL    BUN 17 8 - 28 mg/dL    Creatinine 0.74 0.60 - 1.10 mg/dL    GFR MDRD Af Amer >60 >60 mL/min/1.73m2    GFR MDRD Non Af Amer >60 >60 mL/min/1.73m2      ______________________________________________________________________    Pertinent radiology for this visit includes the following:    Xr Lumbar Spine 2 Or 3 Vws    Result Date: 8/31/2018  EXAM DATE:         08/31/2018 [facility] X-RAY LUMBAR SPINE, AP AND LATERAL 8/31/2018 11:30 AM INDICATION: Pain in left knee COMPARISON: None. FINDINGS: 5 lumbar type vertebral bodies. Normal lumbar alignment. Vertebral body heights are maintained. No displaced fracture. No aggressive sclerotic or lytic osseous lesion. Multilevel degenerative disc disease of the thoracolumbar spine. Bulky anterolateral osteophytes at T11-T12 and T12-L1. Mild degenerative endplate changes throughout the lumbar spine. No significant disc height loss. Diffuse facet arthropathy, severe at L4-L5 and L5-S1. Atherosclerotic calcification of the abdominal aorta and iliac arteries. Sacroiliac joints are normal. Nonobstructive bowel gas pattern.     Xr Knee Left Plus Sunrise Vw    Result Date: 8/31/2018  EXAM DATE:         08/31/2018 [facility] X-RAY KNEE, LEFT, 3 VIEWS 8/31/2018 11:00 AM INDICATION: Pain in left knee. COMPARISON: None. FINDINGS: Moderate to severe patellofemoral compartment predominant, tricompartmental left knee osteoarthritis. No acute knee fracture or dislocation. Small left knee joint effusion. No significant anterior knee soft tissue swelling.     Xr Pelvis W 2 Vw Hip Left    Result Date: 8/31/2018  EXAM DATE:         08/31/2018 [facility] X-RAY HIP, LEFT, MINIMUM 2 VIEWS 8/31/2018 11:15 AM  "INDICATION: Left hip pain. COMPARISON: None. FINDINGS: Anatomic alignment. No acute left hip fracture. Mild bilateral hip osteoarthritis. No acute displaced pelvic fracture. Degenerative change lower lumbar spine and at the symphysis pubis.       ______________________________________________________________________     ______________________________________________________________________    Assessment:    1. Left leg pain    2. HTN (hypertension)    3. Need for influenza vaccination    4. Macular degeneration (senile) of retina    5. Anxiety    6. Back pain    7. Hip pain, left    8. Left knee pain    9. Paroxysmal atrial fibrillation (H)    10. Morbid obesity (H)    11. Controlled substance agreement signed - 8/18 - Lorazepam (intermittent use) - UDS 2/18      ______________________________________________________________________      PHQ-2 Total Score: 0 (8/31/2018 10:00 AM)  No Data Recorded    Estimated body mass index is 40.73 kg/(m^2) as calculated from the following:    Height as of 2/27/18: 5' 4.5\" (1.638 m).    Weight as of 4/27/18: 241 lb (109.3 kg).     ______________________________________________________________________    Plan:    1. I suspect the pain in her leg is radiculopathic in nature.  She may benefit from MRI.  2. X-rays of the low back, left hip, left knee done today.  3. Refilled medications.  4. Consider further follow-up or workup if worsening.    Denis Frias MD  General Internal Medicine  [facility]     Return in about 6 months (around 2/28/2019), or if symptoms worsen or fail to improve.     No future appointments.      ______________________________________________________________________     Relevant ICD-10 codes and order associations:      ICD-10-CM    1. Left leg pain M79.605    2. HTN (hypertension) I10 hydroCHLOROthiazide (HYDRODIURIL) 25 MG tablet     amLODIPine (NORVASC) 5 MG tablet   3. Need for influenza vaccination Z23    4. Macular degeneration " (senile) of retina H35.30    5. Anxiety F41.9 LORazepam (ATIVAN) 0.5 MG tablet   6. Back pain M54.9 XR Lumbar Spine 2 or 3 VWS     XR Lumbar Spine 2 or 3 VWS   7. Hip pain, left M25.552 XR Pelvis W 2 Vw Hip Left     XR Pelvis W 2 Vw Hip Left   8. Left knee pain M25.562 XR Knee Left Plus Sunrise VW     XR Knee Left Plus Sunrise VW   9. Paroxysmal atrial fibrillation (H) I48.0    10. Morbid obesity (H) E66.01    11. Controlled substance agreement signed - 8/18 - Lorazepam (intermittent use) - UDS 2/18 Z79.899

## 2021-06-26 NOTE — PROGRESS NOTES
Progress Notes by Denis Frias MD at 4/27/2018  2:40 PM     Author: Denis Frias MD Service: -- Author Type: Physician    Filed: 4/27/2018  9:47 PM Encounter Date: 4/27/2018 Status: Signed    : Denis Frias MD (Physician)            [facility]    Comprehensive and complex medical care - Chronic disease management - Shared decision making - Care coordination - Compassionate care    Patient advocacy - Rational deprescribing - Minimally disruptive medicine - Ethical focus - Customized care    ______________________________________________________________________     Date of Service: 4/27/2018  Primary Provider: Denis Frias MD    Patient Care Team:  Denis Frias MD as PCP - General (Internal Medicine)  Basim Rivera MD (Ophthalmology)  Tami De La Torre DC (Chiropractic Medicine)     ______________________________________________________________________     Patient's Pharmacy:    [facility] -[address] [address]  Phone: [phone] Fax: [phone]     Patient's Contacts:  Name Home Phone Work Phone Mobile Phone Relationship Lg SASHA Hodges   [phone] Child    MIC M[place]   [phone] Spouse      Patient's Insurance:    Payor: MEDICARE / Plan: MEDICARE A AND B / Product Type: Medicare /     ______________________________________________________________________     Preoperative examination    Francesca [place] is a 83 y.o. female (1935) who I am asked to see by his surgeon regarding his fitness for upcoming surgery.      Chief Complaint   Patient presents with   ? Pre-op Exam     5/11 and 5/18, Cataract, [facility], Dr Fernando    ? Medication check     Would like prescription for flonase, seems to be helping     ______________________________________________________________________     History of present illness:    Patient comes in today  for preoperative evaluation prior to her planned bilateral cataract repair.  She will have 2 procedures  by 1 week.  This will be done by Dr. Rivera.  She has no major concerns about it.    We reviewed her blood pressure and her blood pressure has been running high at times.  Is better today.  She has not had any chest pain or chest pressure.    We reviewed her anxiety and this is stable at this time.  She has not had any worsening.    She will likely take a Lorazepam prior to her surgery.    Reviewed her allergic rhinitis in the Flonase works for her when she needs it.  We did a refill for this.    We reviewed her other issues noted in the assessment but not specifically addressed in the HPI above.   ______________________________________________________________________     Patient Active Problem List   Diagnosis   ? HTN (hypertension)   ? Anxiety   ? BPV (benign positional vertigo)   ? AR (allergic rhinitis)   ? Macular degeneration (senile) of retina   ? RAHEEL (obstructive sleep apnea) - CPAP 9 cm H2O   ? OA (osteoarthritis) - hands and feet   ? Paroxysmal atrial fibrillation - 2 short lived episodes in the past - Chooses aspirin for anticoagulation   ? Nonsmoker   ? Full code status   ? Controlled substance agreement signed - 8/17 - Lorazepam (intermittent use) - UDS 2/18     Past Surgical History:   Procedure Laterality Date   ? APPENDECTOMY     ? CHOLECYSTECTOMY     ? HEMORRHOID SURGERY     ? HYSTERECTOMY     ? TONSILLECTOMY     ? TOTAL KNEE ARTHROPLASTY Right  2009      Social History     Social History   ? Marital status:      Spouse name: Tai   ? Number of children: 5   ? Years of education: N/A     Occupational History   ?  Retired     Social History Main Topics   ? Smoking status: Never Smoker   ? Smokeless tobacco: Never Used   ? Alcohol use 0.0 - 0.5 oz/week     0 - 1 Standard drinks or equivalent per week   ? Drug use: None   ? Sexual activity: Not Asked     Other Topics Concern   ?  None     Social History Narrative    Patient of Dr. Frias since 2005.         .   has severe COPD.        Daughter Kaila is a RN at [employer] in [place].        Sister in law is Radhika Odonnell, another patient of mine.      Family History   Problem Relation Age of Onset   ? Alcohol abuse Mother    ? Liver disease Mother    ? Alcohol abuse Father    ? Colon cancer Brother    ? Lung cancer Brother    ? Lung disease Brother    ? OCD Son    ? Depression Son       Family history is noncontributory otherwise.    Allergies: Amitriptyline; Codeine; Dicloxacillin sodium; Erythromycin; Hydrocodone-acetaminophen; Ofloxacin; and Sulfacetamide sodium     Current medications:  Current Outpatient Prescriptions   Medication Sig Note   ? amLODIPine (NORVASC) 5 MG tablet Take 1 tablet (5 mg total) by mouth 2 (two) times a day.    ? aspirin 325 MG tablet Take 325 mg by mouth. 5/14/2015: Received from: [facility]   ? cholecalciferol, vitamin D3, 5,000 unit Tab Take by mouth.    ? hydroCHLOROthiazide (HYDRODIURIL) 25 MG tablet Take 1 tablet (25 mg total) by mouth daily.    ? LORazepam (ATIVAN) 0.5 MG tablet Take 1 tablet (0.5 mg total) by mouth every 6 (six) hours as needed for anxiety.    ? magnesium 100 mg cap  5/14/2015: Received from: [facility]   ? OMEGA-3 FATTY ACIDS ORAL Take 2 g by mouth. 5/14/2015: Received from: [facility]   ? UNABLE TO FIND Med Name: OTC  Presen- Vision Areds 2 Formula    ? fluticasone (FLONASE) 50 mcg/actuation nasal spray 2 sprays into each nostril daily.        Surgery specific questions:    Anesthesia/family history of complications: No  History of abnormal bleeding: No  Can patient walk a flight of stairs without stopping: Yes    Review of systems:    On ROS, the patient denies any chest pain or pressure.  No shortness of breath.   ______________________________________________________________________     Exam:    Wt Readings from Last 3 Encounters:   04/27/18  (!) 241 lb (109.3 kg)   02/27/18 (!) 236 lb 1.6 oz (107.1 kg)   08/15/17 (!) 238 lb 3.2 oz (108 kg)     BP Readings from Last 3 Encounters:   04/27/18 128/60   02/27/18 132/74   08/15/17 140/70     /60  Pulse 74  Wt (!) 241 lb (109.3 kg)  SpO2 95%  BMI 40.73 kg/m2   Patient is in no acute distress.  Mood anxious.  Insight good.  Eyes nonicteric.  Pupils equal.  Ears clear.  Nose clear.  Throat clear.  Neck is supple.  No cervical adenopathy.  No thyromegaly.  Heart is regular rate and rhythm. There is a 1 out of 6 systolic murmur ejection quality in the aortic position.  Lungs clear to auscultation bilaterally.  Respiratory effort is good.  Abdomen is soft, nontender, no hepatosplenomegaly.  Extremities no edema.      ______________________________________________________________________    Diagnostics:    Results for orders placed or performed in visit on 04/27/18   Basic Metabolic Panel   Result Value Ref Range    Sodium 142 136 - 145 mmol/L    Potassium 3.9 3.5 - 5.0 mmol/L    Chloride 108 (H) 98 - 107 mmol/L    CO2 25 22 - 31 mmol/L    Anion Gap, Calculation 9 5 - 18 mmol/L    Glucose 143 (H) 70 - 125 mg/dL    Calcium 10.0 8.5 - 10.5 mg/dL    BUN 17 8 - 28 mg/dL    Creatinine 0.74 0.60 - 1.10 mg/dL    GFR MDRD Af Amer >60 >60 mL/min/1.73m2    GFR MDRD Non Af Amer >60 >60 mL/min/1.73m2      ______________________________________________________________________     Impression:    1. Preoperative cardiovascular examination    2. Cataract, bilateral    3. HTN (hypertension)    4. Anxiety    5. Macular degeneration (senile) of retina    6. AR (allergic rhinitis)      ______________________________________________________________________     PHQ-2 Total Score: 0 (2/27/2018  9:00 AM)  No Data Recorded  ______________________________________________________________________     Recommendations:    1. Patient is okay to proceed with surgery as planned.  2. She can take her hydrochlorothiazide and amlodipine on  the morning before the procedure.  3. She will likely take her Lorazepam prior to the procedure to keep her mood and anxiety under control.  4. Flonase refilled today.       Denis Frias MD  General Internal Medicine  [facility]     Personal office fax - [phone]   Voice mail - [phone]  E-mail - [email or web address]      Return in about 6 months (around 10/27/2018) for follow up visit.     No future appointments.     ______________________________________________________________________     Relevant ICD-10 codes and order associations:      ICD-10-CM    1. Preoperative cardiovascular examination Z01.810    2. Cataract, bilateral H26.9    3. HTN (hypertension) I10 Basic Metabolic Panel   4. Anxiety F41.9    5. Macular degeneration (senile) of retina H35.30    6. AR (allergic rhinitis) J30.9 fluticasone (FLONASE) 50 mcg/actuation nasal spray

## 2021-06-27 NOTE — PROGRESS NOTES
Progress Notes by Denis Frias MD at 5/30/2019 11:00 AM     Author: Denis Frias MD Service: -- Author Type: Physician    Filed: 5/30/2019 12:52 PM Encounter Date: 5/30/2019 Status: Signed    : Denis Frias MD (Physician)              [facility]    Comprehensive and complex medical care - Chronic disease management - Shared decision making - Care coordination - Compassionate care    Patient advocacy - Rational deprescribing - Minimally disruptive medicine - Ethical focus - Customized care    ______________________________________________________________________     Date of Service: 5/30/2019  Primary Provider: Denis Frias MD    Patient Care Team:  Denis Frias MD as PCP - General (Internal Medicine)  Basim Rivera MD (Ophthalmology)  Tami De La Torre DC (Chiropractic Medicine)     ______________________________________________________________________     Patient's Pharmacy:    [facility] -[address] [address]  Phone: [phone] Fax: [phone]     Patient's Contacts:  Name Home Phone Work Phone Mobile Phone Relationship [facility]   [phone] Child    MIC MCKINNEY   [phone] Spouse      Patient's Insurance:    Payor: MEDICARE / Plan: MEDICARE A AND B / Product Type: Medicare /   ______________________________________________________________________   ______________________________________________________________________     Francesca Mckinney is 84 y.o. female who comes in today for:  Chief Complaint   Patient presents with   ? Follow-up     is a little better but still sinus issues, allergies, and cold      ______________________________________________________________________     Active Problem List:  Problem List as of 5/30/2019 Reviewed: 4/27/2018  9:42 PM by Denis Frias MD       High    Full code status    Nonsmoker    Paroxysmal  atrial fibrillation (H) - 3 short lived episodes in the past - Chooses aspirin for anticoagulation       Medium    Controlled substance agreement signed - 8/18 - Lorazepam (intermittent use) - UDS 2/19    HTN (hypertension)    Anxiety    BPV (benign positional vertigo)       Low    AR (allergic rhinitis)    Macular degeneration (senile) of retina    Morbid obesity (H)    OA (osteoarthritis) - hands and feet    RAHEEL (obstructive sleep apnea) - CPAP 9 cm H2O           Current Outpatient Medications   Medication Sig Note   ? amLODIPine (NORVASC) 5 MG tablet Take 1 tablet (5 mg total) by mouth 2 (two) times a day.    ? aspirin 325 MG tablet Take 325 mg by mouth. 5/14/2015: Received from: [facility]   ? cholecalciferol, vitamin D3, 5,000 unit Tab Take by mouth.    ? fluticasone (FLONASE) 50 mcg/actuation nasal spray 2 sprays into each nostril daily.    ? hydroCHLOROthiazide (HYDRODIURIL) 25 MG tablet Take 1 tablet (25 mg total) by mouth daily.    ? LORazepam (ATIVAN) 0.5 MG tablet Take 1 tablet (0.5 mg total) by mouth 2 (two) times a day as needed for anxiety. May refill every 30 days only.    ? magnesium 100 mg cap  5/14/2015: Received from: [facility]   ? OMEGA-3 FATTY ACIDS ORAL Take 2 g by mouth. 5/14/2015: Received from: [facility]   ? UNABLE TO FIND Med Name: OTC  Presen- Vision Areds 2 Formula    ? predniSONE (DELTASONE) 20 MG tablet Take 40 mg by mouth daily for 5 days.    ? propafenone (RYTHMOL) 300 MG tablet Take 2 tablets (600 mg total) by mouth once for 1 dose. As needed for atrial fibrillation.       ______________________________________________________________________       History of present illness:    Comes in today due to a persistent cough of 3-4 weeks.    Called in in relationship to rhinosinusitis 10 days ago and we started doxycycline for this which has helped.  She is about 50% better.  Her symptoms that have improved a lot:  Facial pressure, sneezing, itchy eyes, ear fullness, run down  feeling.    Cough persists and she has some issues with breathing.  No wheezing.  No fever or chills.  Feels like in the chest.  Cough productive of clear sputum.  Using mucinex with some help.    Hypertension reviewed and anxiety is reviewed.  Her  is stable but sick.    On review of systems, the patient denies any chest pain or nausea or vomiting.  ______________________________________________________________________     Exam:    /78   Pulse 97   Temp 97.7  F (36.5  C) (Oral)   Wt (!) 238 lb (108 kg)   SpO2 95%   BMI 40.22 kg/m    Patient is comfortable, no apparent distress.  Mood good.  Insight good.  Ears - clear.  Nose exam shows moderate reddish rhinitis.  Throat - clear.  Neck is supple, there is no cervical adenopathy.  No thyromegaly.  Heart regular rate and rhythm.  There is a 3/6 systolic ejection quality murmur in the aortic position. Lungs are clear to auscultation bilaterally.  Respiratory effort is good. 1-2+ ankle edema.    ______________________________________________________________________    Pertinent radiology for this visit includes the following:    XR Chest 2 Views  EXAM DATE:         05/30/2019    EXAM: X-RAY CHEST, 2 VIEWS, FRONTAL AND LATERAL  LOCATION: [facility]  DATE/TIME: 5/30/2019 11:30 AM    INDICATION: Cough  COMPARISON: 03/21/2014.    FINDINGS: The lungs are clear. The heart size and pulmonary vascularity are  normal. No pneumothorax or pleural effusion. Again noted are flowing  syndesmophytes in the thoracic spine consistent with diffuse idiopathic skeletal  hyperostosis.      ______________________________________________________________________    ______________________________________________________________________     Assessment:    1. Persistent cough    2. Rhinosinusitis    3. Essential hypertension    4. Anxiety       ______________________________________________________________________     PHQ-2 Total Score: 0 (2/28/2019 12:00  PM)    No data recorded  ______________________________________________________________________       Plan:    1. Antibiotics seemed to have helped, but incompletely.  2. Will try a course of prednisone at this time and chest x-ray (CXR) checked.  3. Consider course of cefuroxime (Ceftin) if worsening.  4. Encouraged the patient to come back if not doing well.       Denis Frias MD  General Internal Medicine  [facility]     Personal office fax - [phone]   Voice mail - [phone]  E-mail - [email or web address]      Return in about 6 months (around 11/30/2019) for visit and blood work.     No future appointments.     ______________________________________________________________________    Relevant ICD-10 codes and order associations:      ICD-10-CM    1. Persistent cough R05 XR Chest 2 Views   2. Rhinosinusitis J32.9 predniSONE (DELTASONE) 20 MG tablet   3. Essential hypertension I10    4. Anxiety F41.9

## 2021-06-27 NOTE — PROGRESS NOTES
Progress Notes by Denis Frias MD at 8/16/2019  8:50 AM     Author: Denis Frias MD Service: -- Author Type: Physician    Filed: 8/16/2019  9:55 PM Encounter Date: 8/16/2019 Status: Signed    : Denis Frias MD (Physician)              [facility]    Comprehensive and complex medical care - Chronic disease management - Shared decision making - Care coordination - Compassionate care    Patient advocacy - Rational deprescribing - Minimally disruptive medicine - Ethical focus - Customized care    ______________________________________________________________________     Date of Service: 8/16/2019  Primary Provider: Denis Frias MD    Patient Care Team:  Denis Frias MD as PCP - General (Internal Medicine)  Basim Rivera MD (Ophthalmology)  Tami De La Torre DC (Chiropractic Medicine)     ______________________________________________________________________     Patient's Pharmacy:    [facility] -[address] [address]  Phone: [phone] Fax: [phone]     Patient's Contacts:  Name Home Phone Work Phone Mobile Phone Relationship [facility]us   [phone] Child    MIC MCKINNEY   [phone] Spouse      Patient's Insurance:    Payor: MEDICARE / Plan: MEDICARE A AND B / Product Type: Medicare /   ______________________________________________________________________   ______________________________________________________________________     Francesca Mckinney is a 84 y.o. female who comes in today for:    Chief Complaint   Patient presents with   ? Follow-up     a fib, still has cough not bad it is more so when wakes up   ? Ankle Pain     left ankle pain x 2-3 weeks, is better       Active Problem List:  Problem List as of 8/16/2019 Reviewed: 8/16/2019  9:45 PM by Denis Frias MD       High    Full code status    Nonsmoker    Paroxysmal atrial  fibrillation (H) - 3 short lived episodes in the past - Chooses aspirin for anticoagulation       Medium    Controlled substance agreement signed - 8/18 - Lorazepam (intermittent use) - UDS 2/19    HTN (hypertension)    Anxiety    BPV (benign positional vertigo)       Low    Age-related macular degeneration, wet, both eyes (H)    AR (allergic rhinitis)    Morbid obesity (H)    OA (osteoarthritis) - hands and feet    RAHEEL (obstructive sleep apnea) - CPAP 9 cm H2O           Current Outpatient Medications   Medication Sig Note   ? amLODIPine (NORVASC) 5 MG tablet Take 1 tablet (5 mg total) by mouth 2 (two) times a day.    ? aspirin 325 MG tablet Take 325 mg by mouth. 5/14/2015: Received from: [facility]   ? cholecalciferol, vitamin D3, 5,000 unit Tab Take by mouth.    ? fluticasone propionate (FLONASE) 50 mcg/actuation nasal spray 2 sprays into each nostril daily.    ? hydroCHLOROthiazide (HYDRODIURIL) 25 MG tablet Take 1 tablet (25 mg total) by mouth daily.    ? LORazepam (ATIVAN) 0.5 MG tablet Take 1 tablet (0.5 mg total) by mouth 2 (two) times a day as needed for anxiety. May refill every 30 days only.    ? magnesium 100 mg cap  5/14/2015: Received from: [facility]   ? OMEGA-3 FATTY ACIDS ORAL Take 2 g by mouth. 5/14/2015: Received from: [facility]   ? propafenone (RYTHMOL) 300 MG tablet Take 2 tablets (600 mg total) by mouth once for 1 dose. As needed for atrial fibrillation.    ? UNABLE TO FIND Med Name: OTC  Presen- Vision Areds 2 Formula      Social History     Social History Narrative    Patient of Dr. Frias since 2005.         .   has severe COPD.        Daughter Kaila is a RN at [employer] in [place].        Sister is Radhika Odonnell, another patient of mine.     ______________________________________________________________________     History of present illness:    In for follow up multiple issues.    Reviewed her hypertension today.  Blood pressure has been in the  goal range.  Denies any excessive dizziness from the medication with this.     Reviewed the patient's anxiety and this is doing well.  Uses her lorazepam (Ativan) sparingly.  She would like to try melatonin for sleep at night if she can.    Back and left leg radiculopathy is stable at this time without issues.    Left posterior ankle sore for 6 weeks.  Was on feet travelling in [place] when this started.  Worse with ambulation and standing and dorsiflexion.  There is swelling over the Achilles.  Came on rather suddenly.  No loss of function and pain is improving significantly.    Has stable shortness of breath with a known heart murmur.    We reviewed her other issues noted in the assessment but not specifically addressed in the HPI above.     On review of systems, the patient denies any chest pain or shortness of breath.    ______________________________________________________________________    Exam:    Wt Readings from Last 3 Encounters:   08/16/19 (!) 237 lb (107.5 kg)   05/30/19 (!) 238 lb (108 kg)   02/28/19 (!) 236 lb (107 kg)     BP Readings from Last 3 Encounters:   08/16/19 136/70   05/30/19 124/78   02/28/19 134/72     /70   Pulse 73   Wt (!) 237 lb (107.5 kg)   SpO2 95%   BMI 40.05 kg/m      The patient is comfortable, no acute distress.  Mood good.  Insight good.  Eyes are nonicteric.  Neck is supple without mass.  No cervical adenopathy.  No thyromegaly. Heart regular rate and rhythm. There is a 2/6 systolic ejection quality murmur in the aortic position. Lungs clear to auscultation bilaterally.  Respiratory effort is good.  Abdomen soft and nontender.  No hepatosplenomegaly.  Extremities trace bilateral edema.  There is pain and swelling of the left achilles tendon.  Thre is no weakness to plantar or dorsiflexion.  Gait slightly limping.    ______________________________________________________________________    Diagnostics:    Results for orders placed or performed in visit on 08/16/19    Glycosylated Hemoglobin A1c   Result Value Ref Range    Hemoglobin A1c 6.3 (H) 3.5 - 6.0 %   Basic Metabolic Panel   Result Value Ref Range    Sodium 141 136 - 145 mmol/L    Potassium 4.4 3.5 - 5.0 mmol/L    Chloride 106 98 - 107 mmol/L    CO2 25 22 - 31 mmol/L    Anion Gap, Calculation 10 5 - 18 mmol/L    Glucose 103 70 - 125 mg/dL    Calcium 10.1 8.5 - 10.5 mg/dL    BUN 12 8 - 28 mg/dL    Creatinine 0.71 0.60 - 1.10 mg/dL    GFR MDRD Af Amer >60 >60 mL/min/1.73m2    GFR MDRD Non Af Amer >60 >60 mL/min/1.73m2   BNP(B-type Natriuretic Peptide)   Result Value Ref Range    BNP 26 0 - 163 pg/mL   HM2(CBC w/o Differential)   Result Value Ref Range    WBC 5.1 4.0 - 11.0 thou/uL    RBC 5.33 3.80 - 5.40 mill/uL    Hemoglobin 13.6 12.0 - 16.0 g/dL    Hematocrit 41.5 35.0 - 47.0 %    MCV 78 (L) 80 - 100 fL    MCH 25.5 (L) 27.0 - 34.0 pg    MCHC 32.8 32.0 - 36.0 g/dL    RDW 13.9 11.0 - 14.5 %    Platelets 235 140 - 440 thou/uL    MPV 8.7 7.0 - 10.0 fL     ______________________________________________________________________    Assessment:    1. Essential hypertension    2. Allergic rhinitis, unspecified seasonality, unspecified trigger    3. Anxiety    4. Paroxysmal atrial fibrillation (H) - 3 short lived episodes in the past - Chooses aspirin for anticoagulation    5. Elevated blood sugar    6. SOB (shortness of breath)    7. Left Achilles tendinitis    8. RAHEEL (obstructive sleep apnea) - CPAP 9 cm H2O    9. Bilateral exudative age-related macular degeneration, unspecified stage (H)       ______________________________________________________________________     PHQ-2 Total Score: 0 (2/28/2019 12:00 PM)    No data recorded  ______________________________________________________________________     BMI Readings from Last 1 Encounters:   08/16/19 40.05 kg/m      ______________________________________________________________________       Plan:    1. Check blood work today.  See relevant orders and diagnosis associations at  the bottom of this note.   2. Follow up if worsening.  Otherwise, in 6 months.  3. Refilled medications.  4. Controlled substance agreement (CSA) renewed today.  5. Obstructive sleep apnea is doing well without any CPAP problems and no excessive somnolence.  The patient uses CPAP nightly and benefits from it.  6. Follow up with ophthalmology related to wet macular degeneration with every 2 month shots.  7. Consider further imaging of ankle if worsening.  8. Try melatonin for sleep.         Denis Frias MD  General Internal Medicine  [facility]     Personal office fax - [phone]   Voice mail - [phone]  E-mail - [email or web address]      Return in about 6 months (around 2/16/2020) for follow up visit.     No future appointments.     ______________________________________________________________________    Relevant ICD-10 codes and order associations:      ICD-10-CM    1. Essential hypertension I10 amLODIPine (NORVASC) 5 MG tablet     hydroCHLOROthiazide (HYDRODIURIL) 25 MG tablet     Basic Metabolic Panel   2. Allergic rhinitis, unspecified seasonality, unspecified trigger J30.9 fluticasone propionate (FLONASE) 50 mcg/actuation nasal spray   3. Anxiety F41.9 LORazepam (ATIVAN) 0.5 MG tablet   4. Paroxysmal atrial fibrillation (H) - 3 short lived episodes in the past - Chooses aspirin for anticoagulation I48.0    5. Elevated blood sugar R73.9 Glycosylated Hemoglobin A1c   6. SOB (shortness of breath) R06.02 BNP(B-type Natriuretic Peptide)     HM2(CBC w/o Differential)   7. Left Achilles tendinitis M76.62    8. RAHEEL (obstructive sleep apnea) - CPAP 9 cm H2O G47.33    9. Bilateral exudative age-related macular degeneration, unspecified stage (H) H35.3230

## 2021-06-27 NOTE — PROGRESS NOTES
Progress Notes by Denis Frias MD at 2/28/2019 12:50 PM     Author: Denis Frias MD Service: -- Author Type: Physician    Filed: 2/28/2019 10:25 PM Encounter Date: 2/28/2019 Status: Signed    : Denis Frias MD (Physician)              [facility]    Comprehensive and complex medical care - Chronic disease management - Shared decision making - Care coordination - Compassionate care    Patient advocacy - Rational deprescribing - Minimally disruptive medicine - Ethical focus - Customized care    ______________________________________________________________________     Date of Service: 2/28/2019  Primary Provider: Denis Frias MD    Patient Care Team:  Denis Frias MD as PCP - General (Internal Medicine)  Basim Rivera MD (Ophthalmology)  Tami De La Torre DC (Chiropractic Medicine)     ______________________________________________________________________     Patient's Pharmacy:    [facility] -[address] [address]  Phone: [phone] Fax: [phone]     Patient's Contacts:  Name Home Phone Work Phone Mobile Phone Relationship [facility] Center[facility] Lemus   [phone] Child    MIC MCKINNEY   [phone] Spouse      Patient's Insurance:    Payor: MEDICARE / Plan: MEDICARE A AND B / Product Type: Medicare /     ______________________________________________________________________     Francesca Mckinney is 84 y.o. female who comes in today for:    Chief Complaint   Patient presents with   ? Follow-up     left hip and leg pain doing better       Active Problem List:  Problem List as of 2/28/2019 Reviewed: 4/27/2018  9:42 PM by Denis Frias MD       High    Full code status    Nonsmoker    Paroxysmal atrial fibrillation (H) - 2 short lived episodes in the past - Chooses aspirin for anticoagulation       Medium    Controlled substance agreement signed - 8/18 - Lorazepam  (intermittent use) - UDS 2/18    HTN (hypertension)    Anxiety    BPV (benign positional vertigo)       Low    AR (allergic rhinitis)    Macular degeneration (senile) of retina    Morbid obesity (H)    OA (osteoarthritis) - hands and feet    RAHEEL (obstructive sleep apnea) - CPAP 9 cm H2O           Current Outpatient Medications   Medication Sig Note   ? amLODIPine (NORVASC) 5 MG tablet Take 1 tablet (5 mg total) by mouth 2 (two) times a day.    ? aspirin 325 MG tablet Take 325 mg by mouth. 5/14/2015: Received from: [facility]   ? cholecalciferol, vitamin D3, 5,000 unit Tab Take by mouth.    ? fluticasone (FLONASE) 50 mcg/actuation nasal spray 2 sprays into each nostril daily.    ? hydroCHLOROthiazide (HYDRODIURIL) 25 MG tablet Take 1 tablet (25 mg total) by mouth daily.    ? LORazepam (ATIVAN) 0.5 MG tablet Take 1 tablet (0.5 mg total) by mouth 2 (two) times a day as needed for anxiety. May refill every 30 days only.    ? magnesium 100 mg cap  5/14/2015: Received from: [facility]   ? OMEGA-3 FATTY ACIDS ORAL Take 2 g by mouth. 5/14/2015: Received from: [employer]   ? UNABLE TO FIND Med Name: OTC  Presen- Vision Areds 2 Formula    ? propafenone (RYTHMOL) 300 MG tablet Take 2 tablets (600 mg total) by mouth once for 1 dose. As needed for atrial fibrillation.      Social History     Social History Narrative    Patient of Dr. Frias since 2005.         .   has severe COPD.        Daughter Kaila is a RN at [employer] in [place].        Sister in law is Radhika Odonnell, another patient of mine.     ______________________________________________________________________     History of present illness:    First would like a 's form done for disability parking through [place] - form done today.    Reviewed her atrial fibrillation.  May have had an episode in December.  Emergency room (ER) notes reviewed.  She has had 3 short lived episodes in 7 years.  Echocardiogram in 2012  normal.  She would like to have a pill in the pocket approach for the future if needed for episode especially if on a trip.    Reviewed the patient's anxiety and this is doing well.  Needs urine drug screen (UDS).    Reviewed her hypertension today.  Blood pressure has been in the goal range.  Denies any excessive dizziness from the medication with this.     Recently had rhinosinusitis and was treated by myself with antibiotics.  Nasal pressure.  Cough.  Symptoms for 3 weeks.  She is much better but still a little cough.  No shortness of breath.    Left leg pain is doing well with physical therapy.  Reviewed x-rays and MRI scan.  She is happy with current management.  Some numbness and burning in the left leg.     Planning a trip to [place] with sister in law in the summer.    We reviewed her other issues noted in the assessment but not specifically addressed in the HPI above.     On review of systems, the patient denies any chest pain or shortness of breath.    ______________________________________________________________________    Exam:    Wt Readings from Last 3 Encounters:   02/28/19 (!) 236 lb (107 kg)   04/27/18 (!) 241 lb (109.3 kg)   02/27/18 (!) 236 lb 1.6 oz (107.1 kg)     BP Readings from Last 3 Encounters:   02/28/19 134/72   08/31/18 132/74   04/27/18 128/60     /72   Pulse 76   Temp 97.4  F (36.3  C) (Oral)   Wt (!) 236 lb (107 kg)   SpO2 97%   BMI 39.88 kg/m     The patient is comfortable, no acute distress.  Mood good.  Insight good.  Eyes are nonicteric.  Neck is supple without mass.  No cervical adenopathy.  No thyromegaly. Heart regular rate and rhythm. There is a 2/6 systolic ejection quality murmur in the aortic position. This would be likely related to aortic sclerosis by exam. Lungs clear to auscultation bilaterally.  Respiratory effort is good.  Abdomen soft and nontender.  No hepatosplenomegaly.  Extremities no edema.  Trace rhinitis on exam.  Cold sores on lips.  Minimal limp  with walking.  Tenderness over left trochanteric bursa.    ______________________________________________________________________    Diagnostics:    Results for orders placed or performed in visit on 02/28/19   Drug Abuse 1+, Urine   Result Value Ref Range    Amphetamines Screen Negative Screen Negative    Benzodiazepines Screen Negative Screen Negative    Opiates Screen Negative Screen Negative    Phencyclidine Screen Negative Screen Negative    THC Screen Negative Screen Negative    Barbiturates Screen Negative Screen Negative    Cocaine Metabolite Screen Negative Screen Negative    Methadone Screen Negative Screen Negative    Oxycodone Screen Negative Screen Negative    Creatinine, Urine 79.3 mg/dL     ______________________________________________________________________    Pertinent radiology for this visit includes the following:    XR Lumbar Spine 2 or 3 VWS  EXAM DATE:         08/31/2018    [facility]  X-RAY LUMBAR SPINE, AP AND LATERAL  8/31/2018 11:30 AM    INDICATION: Pain in left knee  COMPARISON: None.    FINDINGS: 5 lumbar type vertebral bodies. Normal lumbar alignment. Vertebral  body heights are maintained. No displaced fracture. No aggressive sclerotic or  lytic osseous lesion.    Multilevel degenerative disc disease of the thoracolumbar spine. Bulky  anterolateral osteophytes at T11-T12 and T12-L1. Mild degenerative endplate  changes throughout the lumbar spine. No significant disc height loss. Diffuse  facet arthropathy, severe at L4-L5 and L5-S1.    Atherosclerotic calcification of the abdominal aorta and iliac arteries.  Sacroiliac joints are normal. Nonobstructive bowel gas pattern.    ______________________________________________________________________       XR Pelvis W 2 Vw Hip Left  EXAM DATE:         08/31/2018    [facility]  X-RAY HIP, LEFT, MINIMUM 2 VIEWS  8/31/2018 11:15 AM    INDICATION: Left hip pain.  COMPARISON: None.    FINDINGS: Anatomic  alignment. No acute left hip fracture. Mild bilateral hip  osteoarthritis. No acute displaced pelvic fracture. Degenerative change lower  lumbar spine and at the symphysis pubis.      ______________________________________________________________________       XR Knee Left Plus Sunrise VW  EXAM DATE:         08/31/2018    [facility]  X-RAY KNEE, LEFT, 3 VIEWS  8/31/2018 11:00 AM    INDICATION: Pain in left knee.  COMPARISON: None.    FINDINGS: Moderate to severe patellofemoral compartment predominant,  tricompartmental left knee osteoarthritis. No acute knee fracture or  dislocation. Small left knee joint effusion. No significant anterior knee soft  tissue swelling.      ______________________________________________________________________    ______________________________________________________________________    Assessment:    1. Paroxysmal atrial fibrillation (H) - 3 short lived episodes in the past - Chooses aspirin for anticoagulation    2. Morbid obesity (H)    3. Essential hypertension    4. Anxiety    5. Controlled substance agreement signed - 8/18 - Lorazepam (intermittent use) - UDS 2/18    6. Encounter for therapeutic drug monitoring    7. AR (allergic rhinitis)    8. Pain of left lower leg    9. Encounter for completion of form with patient    10. Rhinosinusitis      ______________________________________________________________________     PHQ-2 Total Score: 0 (2/28/2019 12:00 PM)    No Data Recorded  ______________________________________________________________________    Plan:    1. We reviewed repeat echocardiogram but the patient declines this at this time.  2. We gave her a prescription for rythmol to take 600 mg to try to miguel atrial fibrillation if occurs.  Declines anticoagulation at this time or other intervention.  3. Continue current blood pressure medications.  4. No need for further antibiotics and follow symptoms.  Notify me if worsens.  5. Urine drug screen (UDS)  done today.    6. Recent blood work from [facility] reviewed.  7. Medications refilled.  8. Continue physical therapy exercises.    Denis Frias MD  General Internal Medicine  [facility]     Return in about 6 months (around 8/28/2019) for visit and blood work.     No future appointments.      ______________________________________________________________________     Relevant ICD-10 codes and order associations:      ICD-10-CM    1. Paroxysmal atrial fibrillation (H) - 3 short lived episodes in the past - Chooses aspirin for anticoagulation I48.0 propafenone (RYTHMOL) 300 MG tablet   2. Morbid obesity (H) E66.01    3. Essential hypertension I10    4. Anxiety F41.9    5. Controlled substance agreement signed - 8/18 - Lorazepam (intermittent use) - UDS 2/18 Z79.899    6. Encounter for therapeutic drug monitoring Z51.81 Drug Abuse 1+, Urine   7. AR (allergic rhinitis) J30.9 fluticasone (FLONASE) 50 mcg/actuation nasal spray   8. Pain of left lower leg M79.662    9. Encounter for completion of form with patient Z02.89    10. Rhinosinusitis J32.9

## 2021-06-28 NOTE — PROGRESS NOTES
Progress Notes by Denis Frias MD at 10/14/2019 11:20 AM     Author: Denis Frias MD Service: -- Author Type: Physician    Filed: 10/14/2019  5:00 PM Encounter Date: 10/14/2019 Status: Signed    : Denis Frias MD (Physician)             [facility]    Comprehensive and complex medical care - Chronic disease management - Shared decision making - Care coordination - Compassionate care    Patient advocacy - Rational deprescribing - Minimally disruptive medicine - Ethical focus - Customized care    ______________________________________________________________________     Date of Service: 10/14/2019  Primary Provider: Denis Frias MD    Patient Care Team:  Denis Frias MD as PCP - General (Internal Medicine)  Basim Rivera MD (Ophthalmology)  Tami De La Torre DC (Chiropractic Medicine)  Denis Frias MD as Assigned PCP     ______________________________________________________________________     Patient's Pharmacy:    [facility] -[address] [address]  Phone: [phone] Fax: [phone]     Patient's Contacts:  Name Home Phone Work Phone Mobile Phone Relationship Lgl GrSASHA Lemus   [phone] Child    MIC MCKINNEY   [phone] Spouse      Patient's Insurance:    Payor: MEDICARE / Plan: MEDICARE A AND B / Product Type: Medicare /   ______________________________________________________________________   ______________________________________________________________________     Francesca Mckinney is a 84 y.o. female who comes in today for:    Chief Complaint   Patient presents with   ? Left Calf Swelling      Friday, Chiropracter noticed swelling       Active Problem List:  Problem List as of 10/14/2019 Reviewed: 10/14/2019  4:54 PM by Denis Frias MD       High    Full code status    Nonsmoker    Paroxysmal atrial fibrillation (H) - 3 short lived  episodes in the past - Chooses aspirin for anticoagulation       Medium    Controlled substance agreement signed - 8/19 - Lorazepam (intermittent use) - UDS 2/19    HTN (hypertension)    Anxiety    BPV (benign positional vertigo)       Low    Age-related macular degeneration, wet, both eyes (H)    AR (allergic rhinitis)    Morbid obesity (H)    OA (osteoarthritis) - hands and feet    RAHEEL (obstructive sleep apnea) - CPAP 9 cm H2O       Unprioritized    Primary insomnia           Current Outpatient Medications   Medication Sig Note   ? amLODIPine (NORVASC) 5 MG tablet Take 1 tablet (5 mg total) by mouth 2 (two) times a day.    ? aspirin 325 MG tablet Take 325 mg by mouth. 5/14/2015: Received from: [facility]   ? cholecalciferol, vitamin D3, 5,000 unit Tab Take by mouth.    ? fluticasone propionate (FLONASE) 50 mcg/actuation nasal spray 2 sprays into each nostril daily.    ? hydroCHLOROthiazide (HYDRODIURIL) 25 MG tablet Take 1 tablet (25 mg total) by mouth daily.    ? LORazepam (ATIVAN) 0.5 MG tablet Take 1 tablet (0.5 mg total) by mouth 2 (two) times a day as needed for anxiety. May refill every 30 days only.    ? magnesium 100 mg cap  5/14/2015: Received from: [facility]   ? OMEGA-3 FATTY ACIDS ORAL Take 2 g by mouth. 5/14/2015: Received from: [facility]   ? UNABLE TO FIND Med Name: OTC  Presen- Vision Areds 2 Formula    ? propafenone (RYTHMOL) 300 MG tablet Take 2 tablets (600 mg total) by mouth once for 1 dose. As needed for atrial fibrillation.      Social History     Patient does not qualify to have social determinant information on file (likely too young).   Social History Narrative    Patient of Dr. Frias since 2005.         .   has severe COPD.        Daughter Kaila is a RN at [employer] in [place].        Sister is Radhika Odonnell, another patient of mine.       Subjective:     History of present illness:    Patient comes in today for follow-up of a number of  issues.    Mainly in today for left leg edema.  She was in her chiropractors office last week at the end of the weekend she noticed that she had increased swelling here.  The chiropractor was concerned about DVT and I think this is reasonable to evaluate for at this time.  The patient has not noticed any pain in the calf per se.  She has not noticed any redness.  She has not noticed any chest pain or shortness of breath.  It is not worsening.  She is recently had some issues with her Achilles tendon which may be contributing.    We reviewed her Achilles tendinitis.  It had been getting better but recently a little worse.  It does bother her especially when first getting up to walk on it.  She has taken some Aleve for it.    We reviewed her high blood pressure her blood pressure is very good at this time.  She has been intentionally working on weight loss with her diet and has lost 13 pounds.    We reviewed her allergic rhinitis and this is doing okay.  She does need a refill of the Flonase.    We reviewed her paroxysmal atrial fibrillation and risk of stroke.  She was discussed about anticoagulation with warfarin or Eliquis or otherwise.  We presented watchman options as well.  She prefers to stay with aspirin at this time despite a approximately 5% risk of stroke per year.  This was discussed with the patient.    We reviewed her other issues noted in the assessment but not specifically addressed in the HPI above.     On review of systems, the patient denies any chest pain or shortness of breath.    Objective:     Wt Readings from Last 3 Encounters:   10/14/19 (!) 224 lb 4 oz (101.7 kg)   08/16/19 (!) 237 lb (107.5 kg)   05/30/19 (!) 238 lb (108 kg)     BP Readings from Last 3 Encounters:   10/14/19 112/70   08/16/19 136/70   05/30/19 124/78     /70 (Patient Site: Right Arm, Patient Position: Sitting, Cuff Size: Adult Regular)   Pulse 66   Wt (!) 224 lb 4 oz (101.7 kg)   SpO2 96%   BMI 37.90 kg/m     The  patient is comfortable, no acute distress.  Mood good.  Insight good.  Eyes are nonicteric.  Neck is supple without mass.  No cervical adenopathy.  No thyromegaly. Heart regular rate and rhythm.  There is a 2/6 systolic ejection quality murmur in the aortic position.   Lungs clear to auscultation bilaterally.  Respiratory effort is good.  Abdomen soft and nontender.  No hepatosplenomegaly.  Extremities 1+ left lower extremity edema.  Some tenderness over the Achilles tendon still.  2  Diagnostics:     Results for orders placed or performed in visit on 08/16/19   Glycosylated Hemoglobin A1c   Result Value Ref Range    Hemoglobin A1c 6.3 (H) 3.5 - 6.0 %   Basic Metabolic Panel   Result Value Ref Range    Sodium 141 136 - 145 mmol/L    Potassium 4.4 3.5 - 5.0 mmol/L    Chloride 106 98 - 107 mmol/L    CO2 25 22 - 31 mmol/L    Anion Gap, Calculation 10 5 - 18 mmol/L    Glucose 103 70 - 125 mg/dL    Calcium 10.1 8.5 - 10.5 mg/dL    BUN 12 8 - 28 mg/dL    Creatinine 0.71 0.60 - 1.10 mg/dL    GFR MDRD Af Amer >60 >60 mL/min/1.73m2    GFR MDRD Non Af Amer >60 >60 mL/min/1.73m2   BNP(B-type Natriuretic Peptide)   Result Value Ref Range    BNP 26 0 - 163 pg/mL   HM2(CBC w/o Differential)   Result Value Ref Range    WBC 5.1 4.0 - 11.0 thou/uL    RBC 5.33 3.80 - 5.40 mill/uL    Hemoglobin 13.6 12.0 - 16.0 g/dL    Hematocrit 41.5 35.0 - 47.0 %    MCV 78 (L) 80 - 100 fL    MCH 25.5 (L) 27.0 - 34.0 pg    MCHC 32.8 32.0 - 36.0 g/dL    RDW 13.9 11.0 - 14.5 %    Platelets 235 140 - 440 thou/uL    MPV 8.7 7.0 - 10.0 fL     ______________________________________________________________________    Pertinent radiology for this visit includes the following:    US Venous Leg Left  EXAM DATE:         10/14/2019    EXAM: US LOWER EXTREMITY VEINS LTD LEFT  LOCATION: [facility]  DATE/TIME: 10/14/2019 3:30 PM    INDICATION: Left leg swelling.  Evaluate for DVT.  Eval for baker`s cyst.  COMPARISON: None.  TECHNIQUE: Venous  Duplex ultrasound of the left lower extremity with and without  compression, augmentation and duplex. Color flow and spectral Doppler with  waveform analysis performed.    FINDINGS: Exam includes the common femoral, femoral, popliteal, and  contralateral common femoral veins as well as segmentally visualized deep calf  veins and greater saphenous vein.    LEFT: No deep vein thrombosis. No superficial thrombophlebitis. No popliteal  cyst.    IMPRESSION:  No DVT or popliteal cyst in the left leg.      ______________________________________________________________________      Assessment:     1. Leg edema, left    2. Allergic rhinitis, unspecified seasonality, unspecified trigger    3. Essential hypertension    4. Left Achilles tendinitis      ______________________________________________________________________     PHQ-2 Total Score: 0 (10/14/2019 11:00 AM)    No data recorded  ______________________________________________________________________     BMI Readings from Last 1 Encounters:   10/14/19 37.90 kg/m        Plan:     1. Ultrasound of the leg done today.  2. Follow clinically.  3. Consider MRI of the Achilles and ankle if needed in the future.  Could consider orthopedic referral if needed.  4. Continue current blood pressure medications.  Could consider reducing amlodipine in the future if edema progresses.  5. Follow-up with me sooner if issues.    Denis Frias MD  General Internal Medicine  [facility]    Return in about 2 months (around 12/14/2019) for follow up visit.     No future appointments.      ______________________________________________________________________     Relevant ICD-10 codes and order associations:      ICD-10-CM    1. Leg edema, left R60.0 US Venous Leg Left     US Venous Leg Left   2. Allergic rhinitis, unspecified seasonality, unspecified trigger J30.9 fluticasone propionate (FLONASE) 50 mcg/actuation nasal spray   3. Essential hypertension I10    4.  Left Achilles tendinitis M76.62

## 2021-06-28 NOTE — PROGRESS NOTES
Progress Notes by Denis Frias MD at 2/24/2020  9:40 AM     Author: Denis Frias MD Service: -- Author Type: Physician    Filed: 2/24/2020  3:26 PM Encounter Date: 2/24/2020 Status: Signed    : Denis Frias MD (Physician)              [facility]    Comprehensive and complex medical care - Chronic disease management - Shared decision making - Care coordination - Compassionate care    Patient advocacy - Rational deprescribing - Minimally disruptive medicine - Ethical focus - Customized care          Date of Service: 2/24/2020  Primary Provider: Denis Frias MD    Patient Care Team:  Denis Frias MD as PCP - General (Internal Medicine)  Basim Rivera MD (Ophthalmology)  Tami De La Torre DC (Chiropractic Medicine)  Denis Frias MD as Assigned PCP     ______________________________________________________________________     Patient's Pharmacy:    [id number] [place], [place] - [address] AT [facility] [address]  [address]  [facility] [address]  Phone: [phone] Fax: [phone]     Patient's Contacts:  Name Home Phone Work Phone Mobile Phone Relationship Lgl GrSASHA Lemus   [phone] Child    MIC MCKINNEY   [phone] Spouse      Patient's Insurance:    Payor: MEDICARE / Plan: MEDICARE A AND B / Product Type: Medicare /           Francesca Mckinney is a 85 y.o. female who comes in today for:    Chief Complaint   Patient presents with   ? Follow-up     left leg edema is good and cleared up, achilles tendinitis is good, paroxysmal a fib        Active Problem List:  Problem List as of 2/24/2020 Reviewed: 2/24/2020  3:21 PM by Denis Frias MD       High    Full code status    Nonsmoker    Paroxysmal atrial fibrillation (H) - 3 short lived episodes in the past - Chooses aspirin for anticoagulation       Medium    Controlled substance agreement signed - 8/19 - Lorazepam (intermittent  use) - UDS 2/20    HTN (hypertension)    Anxiety    BPV (benign positional vertigo)       Low    Age-related macular degeneration, wet, both eyes (H)    AR (allergic rhinitis)    Morbid obesity (H)    OA (osteoarthritis) - hands and feet    RAHEEL (obstructive sleep apnea) - CPAP 9 cm H2O       Unprioritized    Primary insomnia           Current Outpatient Medications   Medication Sig Note   ? amLODIPine (NORVASC) 5 MG tablet Take 1 tablet (5 mg total) by mouth 2 (two) times a day.    ? aspirin 325 MG tablet Take 325 mg by mouth. 5/14/2015: Received from: [facility]   ? cholecalciferol, vitamin D3, 5,000 unit Tab Take by mouth.    ? fluticasone propionate (FLONASE) 50 mcg/actuation nasal spray 2 sprays into each nostril daily.    ? hydroCHLOROthiazide (HYDRODIURIL) 25 MG tablet Take 1 tablet (25 mg total) by mouth daily.    ? LORazepam (ATIVAN) 0.5 MG tablet Take 1 tablet (0.5 mg total) by mouth 2 (two) times a day as needed for anxiety. May refill every 30 days only.    ? magnesium 100 mg cap  5/14/2015: Received from: [facility]   ? OMEGA-3 FATTY ACIDS ORAL Take 2 g by mouth. 5/14/2015: Received from: [facility]   ? UNABLE TO FIND Med Name: OTC  Presen- Vision Areds 2 Formula    ? propafenone (RYTHMOL) 300 MG tablet Take 2 tablets (600 mg total) by mouth once for 1 dose. As needed for atrial fibrillation.      Social History     Social History Narrative    Patient of Dr. Frias since 2005.         .   has severe COPD.        Daughter Kaila is a RN at [employer] in [place].        Sister is Radhika Odonnell, another patient of mine.       Subjective:     Patient comes in today for follow-up of multiple issues.    She was recently in [place] and did well down there.    She has lost weight related to changing her snacking and smaller portions.  She has done well with this.  Her weight is down as noted below.  This is expected and not associated with GI symptoms.    Reviewed her high  blood pressure and her blood pressure is doing well generally.  Her home records are generally in the 110s to 120s.  She denies any worsening edema or shortness of breath.  She denies any chest pain.    We reviewed some burning in her left lateral leg.  It is over the left lateral upper leg.  Her MRI of her back in the past has been good.  She does have some chronic neck pain that has not been worked up.  She sees a chiropractor for this.  It does not hurt more to lay on the left side.    Reviewed her anxiety and things are stable here.    Reviewed her sleep apnea and there is been no changes.    We reviewed her other issues noted in the assessment but not specifically addressed in the HPI above.     On review of systems, the patient denies any chest pain or shortness of breath.    Objective:     Wt Readings from Last 3 Encounters:   02/24/20 217 lb 9.6 oz (98.7 kg)   10/14/19 (!) 224 lb 4 oz (101.7 kg)   08/16/19 (!) 237 lb (107.5 kg)     BP Readings from Last 3 Encounters:   02/24/20 132/70   10/14/19 112/70   08/16/19 136/70     /70   Pulse (!) 53   Wt 217 lb 9.6 oz (98.7 kg)   SpO2 96%   BMI 36.77 kg/m     The patient is comfortable, no acute distress.  Mood good.  Insight good.  Eyes are nonicteric.  Neck is supple without mass.  No cervical adenopathy.  No thyromegaly. Heart regular rate and rhythm.  Lungs clear to auscultation bilaterally.  Respiratory effort is good.  Abdomen soft and nontender.  No hepatosplenomegaly.  Extremities 1+ edema. No trochanteric bursitis on exam today.      Diagnostics:     Results for orders placed or performed in visit on 08/16/19   Glycosylated Hemoglobin A1c   Result Value Ref Range    Hemoglobin A1c 6.3 (H) 3.5 - 6.0 %   Basic Metabolic Panel   Result Value Ref Range    Sodium 141 136 - 145 mmol/L    Potassium 4.4 3.5 - 5.0 mmol/L    Chloride 106 98 - 107 mmol/L    CO2 25 22 - 31 mmol/L    Anion Gap, Calculation 10 5 - 18 mmol/L    Glucose 103 70 - 125 mg/dL     Calcium 10.1 8.5 - 10.5 mg/dL    BUN 12 8 - 28 mg/dL    Creatinine 0.71 0.60 - 1.10 mg/dL    GFR MDRD Af Amer >60 >60 mL/min/1.73m2    GFR MDRD Non Af Amer >60 >60 mL/min/1.73m2   BNP(B-type Natriuretic Peptide)   Result Value Ref Range    BNP 26 0 - 163 pg/mL   HM2(CBC w/o Differential)   Result Value Ref Range    WBC 5.1 4.0 - 11.0 thou/uL    RBC 5.33 3.80 - 5.40 mill/uL    Hemoglobin 13.6 12.0 - 16.0 g/dL    Hematocrit 41.5 35.0 - 47.0 %    MCV 78 (L) 80 - 100 fL    MCH 25.5 (L) 27.0 - 34.0 pg    MCHC 32.8 32.0 - 36.0 g/dL    RDW 13.9 11.0 - 14.5 %    Platelets 235 140 - 440 thou/uL    MPV 8.7 7.0 - 10.0 fL       Quality review:     PHQ-2 Total Score: 0 (2/24/2020  9:00 AM)    No data recorded  ______________________________________________________________________     BMI Readings from Last 1 Encounters:   02/24/20 36.77 kg/m        Assessment and Plan:     1. Paroxysmal atrial fibrillation (H)  No recent episodes at this time.    2. Encounter for therapeutic drug level monitoring    - Drug Abuse 1+, Urine    3. Bilateral exudative age-related macular degeneration, unspecified stage (H)  Follow up ophthalmology     4. Morbid obesity (H)  Working weight loss and doing well with dietary modification.    5. RAHEEL (obstructive sleep apnea) - CPAP 9 cm H2O  No change.  Obstructive sleep apnea is doing well without any CPAP problems and no excessive somnolence.  The patient uses CPAP nightly and benefits from it.     6. Controlled substance agreement signed - 8/19 - Lorazepam (intermittent use) - UDS 2/20  Urine drug screen (UDS) done today.    Consider update of CSA (controlled substance agreement) at the next visit.     7. Essential hypertension  BLOOD WORK next visit.    8. Anxiety  No change in medications.    9. History of burning pain in leg  MRI scan of back good.  Consider other work-up if worsening.          Denis Frias MD  General Internal Medicine  [facility]     Personal  office fax - [phone]   Voice mail - [phone]  E-mail - [email or web address]     Return in about 6 months (around 8/24/2020) for visit and blood work.     No future appointments.      HCC issues resolved at this visit.

## 2021-06-29 NOTE — PROGRESS NOTES
Progress Notes by Denis Frias MD at 10/2/2020 11:20 AM     Author: Denis Frias MD Service: -- Author Type: Physician    Filed: 10/5/2020  9:28 AM Encounter Date: 10/2/2020 Status: Signed    : Denis Frias MD (Physician)              [facility]    Comprehensive and complex medical care - Chronic disease management - Shared decision making - Care coordination - Compassionate care    Patient advocacy - Rational deprescribing - Minimally disruptive medicine - Ethical focus - Customized care          Date of Service: 10/2/2020  Primary Provider: Denis Frias MD    Patient Care Team:  Denis Frias MD as PCP - General (Internal Medicine)  Basim Rivera MD (Ophthalmology)  Tami De La Torre DC (Chiropractic Medicine)  Denis Frias MD as Assigned PCP     ______________________________________________________________________     Patient's Pharmacy:      [id number] [place], [place] - [address] AT [facility] [address]  [address]  [facility] [address]  Phone: [phone] Fax: [phone]     Patient's Contacts:  Name Home Phone Work Phone Mobile Phone Relationship Lgl Grd   SASHA DAVIS   [phone] Child    MIC MCKINNEY   [phone] Spouse        Patient's Insurance:    Payor/Plan Subscr  Sex Relation Sub. Ins. ID Effective Group Num   1. AARP - AARP DARLING MCKINNEY 1935 Female Self [id number]                                    [address]   2. MEDICARE - ME* DARLING MCKINNEY 1935 Female Self [id number]                                    [place], [address]           Darling Mckinney is a 85 y.o. female who comes in today for:    Chief Complaint   Patient presents with   ? Follow-up     swelling and pain in her neck, LEFT SIDE, FEELS QUINTERO ON LEFT SIDE   ? Anxiety       Active Problem List:  Problem List as of 10/2/2020 Reviewed: 2020 12:38 PM by Denis Frias  MD       High    Full code status    Nonsmoker    Paroxysmal atrial fibrillation (H) - 3 short lived episodes in the past - Chooses aspirin for anticoagulation       Medium    Controlled substance agreement signed - 8/20 - Lorazepam (intermittent use) - UDS 2/20    HTN (hypertension)    Anxiety    BPV (benign positional vertigo)       Low    Age-related macular degeneration, wet, both eyes (H)    AR (allergic rhinitis)    Morbid obesity (H)    OA (osteoarthritis) - hands and feet    RAHEEL (obstructive sleep apnea) - CPAP 9 cm H2O       Unprioritized    Primary insomnia           Current Outpatient Medications   Medication Sig Note   ? amLODIPine (NORVASC) 5 MG tablet Take 1 tablet (5 mg total) by mouth 2 (two) times a day.    ? aspirin 325 MG tablet Take 325 mg by mouth. 5/14/2015: Received from: [facility]   ? cholecalciferol, vitamin D3, 5,000 unit Tab Take by mouth.    ? fexofenadine (ALLEGRA) 60 MG tablet Take 60 mg by mouth daily.    ? hydroCHLOROthiazide (HYDRODIURIL) 25 MG tablet Take 1 tablet (25 mg total) by mouth daily.    ? LORazepam (ATIVAN) 0.5 MG tablet Take 1 tablet (0.5 mg total) by mouth 2 (two) times a day as needed for anxiety. May refill every 30 days only.    ? magnesium 100 mg cap  5/14/2015: Received from: [facility]   ? OMEGA-3 FATTY ACIDS ORAL Take 2 g by mouth. 5/14/2015: Received from: [facility]   ? vit A/vit C/vit E/zinc/copper (PRESERVISION AREDS ORAL) Take by mouth daily.    ? vit C,J-Qi-qhlzy-lutein-zeaxan (PRESERVISION AREDS-2) [id number] mg-unit-mg-mg cap Take 4 tablets by mouth daily.    ? cephalexin (KEFLEX) 500 MG capsule Take 1 capsule (500 mg total) by mouth 3 (three) times a day for 7 days.    ? propafenone (RYTHMOL) 300 MG tablet Take 2 tablets (600 mg total) by mouth once for 1 dose. As needed for atrial fibrillation.      Social History     Social History Narrative    Patient of Dr. Frias since 2005.         .   has severe COPD.        Daughter  Kaila is a RN at [employer] in [place].        Sister is Radhika Odonnell, another patient of mine.       Subjective:     Patient comes in today for follow-up of a number of issues.    She is mainly in today for some issues with the left side of her face.  She has been having some left-sided submandibular pain.  It is really not gotten better.  She feels that it is been bothering her quite a bit.  It is been going on for 5 to 6 days.  She has had a little bit of sore throat and a little bit of left ear pain with it as well.  She has had some rhinitis as well.  She denies any fevers or chills.  She has not had any cough.  She denies any shortness of breath.  Her nose has been drippy.  She denies any change in the hearing of her left ear.    Reviewed her high blood pressure and generally this has been doing well.    Reviewed her anxiety and this is stable as well.    We reviewed her other issues noted in the assessment but not specifically addressed in the HPI above.     On review of systems, the patient denies any chest pain or shortness of breath.    Objective:     Wt Readings from Last 3 Encounters:   10/02/20 222 lb (100.7 kg)   08/18/20 212 lb 12.8 oz (96.5 kg)   02/24/20 217 lb 9.6 oz (98.7 kg)     BP Readings from Last 3 Encounters:   10/02/20 138/72   08/18/20 136/62   02/24/20 132/70     /72   Pulse 66   Wt 222 lb (100.7 kg)   SpO2 98%   BMI 37.52 kg/m     Patient is comfortable, no apparent distress.  Mood good.  Insight good.  Ears -clear.  Nose exam shows moderate, bluish-reddish rhinitis.  Throat -slight tonsillar hypertrophy.  Neck is supple, there is no cervical adenopathy.  No thyromegaly.  Heart regular rate and rhythm.  Lungs are clear to auscultation bilaterally.  Respiratory effort is good.  There is some swelling of the submandibular gland on the left without lesion.  Parotid gland normal.    Diagnostics:     Results for orders placed or performed in visit on 10/02/20   Rapid  Strep A Screen-Throat swab    Specimen: Throat   Result Value Ref Range    Rapid Strep A Antigen No Group A Strep detected, presumptive negative No Group A Strep detected, presumptive negative   Group A Strep, RNA Direct Detection, Throat    Specimen: Throat   Result Value Ref Range    Group A Strep by PCR No Group A Strep rRNA detected No Group A Strep rRNA detected       Assessment:     1. Submandibular sialoadenitis    2. Immunization due    3. ST (sore throat)    4. Left ear pain    5. Rhinitis, unspecified type    6. Essential hypertension    7. Anxiety        Quality review:     PHQ-2 Total Score: 0 (8/18/2020  8:00 AM)      No data recorded  ______________________________________________________________________     BMI Readings from Last 1 Encounters:   10/02/20 37.52 kg/m        Plan:     1. Push fluids and lemon drops.  2. Treatment with Keflex at this point.  3. Follow-up sooner if issues.  4. Flu shot done today.  5. Continue other medications as is.      Denis Frias MD  General Internal Medicine  [facility]       Return in about 6 months (around 4/2/2021), or if symptoms worsen or fail to improve, for follow up visit.     No future appointments.      ______________________________________________________________________     Relevant ICD-10 codes and order associations:      ICD-10-CM    1. Submandibular sialoadenitis  K11.20    2. Immunization due  Z23 Influenza,Quad,High Dose,PF 65 YR+   3. ST (sore throat)  J02.9 Rapid Strep A Screen-Throat swab     cephalexin (KEFLEX) 500 MG capsule     Group A Strep, RNA Direct Detection, Throat   4. Left ear pain  H92.02    5. Rhinitis, unspecified type  J31.0    6. Essential hypertension  I10    7. Anxiety  F41.9

## 2021-06-29 NOTE — PROGRESS NOTES
Progress Notes by Denis Frias MD at 2020  8:25 AM     Author: Denis Frias MD Service: -- Author Type: Physician    Filed: 2020 12:43 PM Encounter Date: 2020 Status: Signed    : Denis Frias MD (Physician)              [facility]    Comprehensive and complex medical care - Chronic disease management - Shared decision making - Care coordination - Compassionate care    Patient advocacy - Rational deprescribing - Minimally disruptive medicine - Ethical focus - Customized care          Date of Service: 2020  Primary Provider: Denis Frias MD    Patient Care Team:  Denis Frias MD as PCP - General (Internal Medicine)  Basim Rivera MD (Ophthalmology)  Tami De La Torre DC (Chiropractic Medicine)  Denis Frias MD as Assigned PCP     ______________________________________________________________________     Patient's Pharmacy:      [id number] [place], [place] - [address] AT [facility] [address]  [address]  [facility] [address]  Phone: [phone] Fax: [phone]     Patient's Contacts:  Name Home Phone Work Phone Mobile Phone Relationship Lgl Grd   SASHA DAVIS   [phone] Child    MIC MCKINNEY   [phone] Spouse        Patient's Insurance:    Payor/Plan Subscr  Sex Relation Sub. Ins. ID Effective Group Num   1. AARP - AARP DARLING MCKINNEY 1935 Female  [id number]                                    [address]   2. MEDICARE - ME* DARLING MCKINNEY 1935 Female  [id number]                                    [facility], [address]           Darling Mckinney is a 85 y.o. female who comes in today for:    Chief Complaint   Patient presents with   ? Follow-up     chronic neck pain, sleep apnea, HTN       Active Problem List:  Problem List as of 2020 Reviewed: 2020  3:21 PM by Denis Frias MD       High    Full code status    Nonsmoker     Paroxysmal atrial fibrillation (H) - 3 short lived episodes in the past - Chooses aspirin for anticoagulation       Medium    Controlled substance agreement signed - 8/19 - Lorazepam (intermittent use) - UDS 2/20    HTN (hypertension)    Anxiety    BPV (benign positional vertigo)       Low    Age-related macular degeneration, wet, both eyes (H)    AR (allergic rhinitis)    Morbid obesity (H)    OA (osteoarthritis) - hands and feet    RAHEEL (obstructive sleep apnea) - CPAP 9 cm H2O       Unprioritized    Primary insomnia           Current Outpatient Medications   Medication Sig Note   ? amLODIPine (NORVASC) 5 MG tablet Take 1 tablet (5 mg total) by mouth 2 (two) times a day.    ? aspirin 325 MG tablet Take 325 mg by mouth. 5/14/2015: Received from: [facility]   ? cholecalciferol, vitamin D3, 5,000 unit Tab Take by mouth.    ? fexofenadine (ALLEGRA) 60 MG tablet Take 60 mg by mouth daily.    ? hydroCHLOROthiazide (HYDRODIURIL) 25 MG tablet Take 1 tablet (25 mg total) by mouth daily.    ? LORazepam (ATIVAN) 0.5 MG tablet Take 1 tablet (0.5 mg total) by mouth 2 (two) times a day as needed for anxiety. May refill every 30 days only.    ? magnesium 100 mg cap  5/14/2015: Received from: [facility]   ? OMEGA-3 FATTY ACIDS ORAL Take 2 g by mouth. 5/14/2015: Received from: [facility]   ? UNABLE TO FIND Med Name: OTC  [facility] Areds 2 Formula    ? propafenone (RYTHMOL) 300 MG tablet Take 2 tablets (600 mg total) by mouth once for 1 dose. As needed for atrial fibrillation.        Social History     Social History Narrative    Patient of Dr. Frias since 2005.         .   has severe COPD.        Daughter Kaila is a RN at [employer] in [place].        Sister is Radhika Odonnell, another patient of mine.         Subjective:     Patient comes in today for routine follow-up.    Her blood pressure has been doing well.  She has continued to lose weight on purpose.  She has no issues with edema  or shortness of breath.    We reviewed her macular degeneration and she continues to get shots every 2 months for this.    Reviewed her atrial fibrillation and she has had no paroxysms of this.    She does get occasional left lateral leg numbness.  Her back has been doing better and her legs are doing better as she has been walking regularly.  When she walks it seems to get better with the walking itself.    Reviewed her anxiety and we renewed her controlled substance agreement today.  She still uses the Lorazepam intermittently.    She has had a pressure sensation in both of her ears.  Allegra helps it.  It comes and goes.  It might last for about 10 minutes at a time and usually occurs once a day.  We reviewed different options for this.    We reviewed her other issues noted in the assessment but not specifically addressed in the HPI above.     On review of systems, the patient denies any chest pain or shortness of breath.    Objective:     Wt Readings from Last 3 Encounters:   08/18/20 212 lb 12.8 oz (96.5 kg)   02/24/20 217 lb 9.6 oz (98.7 kg)   10/14/19 (!) 224 lb 4 oz (101.7 kg)       BP Readings from Last 3 Encounters:   08/18/20 136/62   02/24/20 132/70   10/14/19 112/70       /62   Pulse 62   Wt 212 lb 12.8 oz (96.5 kg)   SpO2 97%   BMI 35.96 kg/m     The patient is comfortable, no acute distress.  Mood good.  Insight good.  Eyes are nonicteric.  Neck is supple without mass.  No cervical adenopathy.  No thyromegaly. Heart regular rate and rhythm. There is a 2/6 systolic ejection quality murmur in the aortic position.  Lungs clear to auscultation bilaterally.  Respiratory effort is good.  Abdomen soft and nontender.  No hepatosplenomegaly.  Extremities no edema.  Nose shows mild to moderate reddish rhinitis.  Ears look okay.      Diagnostics:     Results for orders placed or performed in visit on 08/18/20   HM2(CBC w/o Differential)   Result Value Ref Range    WBC 4.7 4.0 - 11.0 thou/uL    RBC 5.05  3.80 - 5.40 mill/uL    Hemoglobin 13.6 12.0 - 16.0 g/dL    Hematocrit 41.1 35.0 - 47.0 %    MCV 81 80 - 100 fL    MCH 26.8 (L) 27.0 - 34.0 pg    MCHC 33.0 32.0 - 36.0 g/dL    RDW 13.1 11.0 - 14.5 %    Platelets 205 140 - 440 thou/uL    MPV 10.0 7.0 - 10.0 fL   Basic Metabolic Panel   Result Value Ref Range    Sodium 143 136 - 145 mmol/L    Potassium 4.3 3.5 - 5.0 mmol/L    Chloride 106 98 - 107 mmol/L    CO2 28 22 - 31 mmol/L    Anion Gap, Calculation 9 5 - 18 mmol/L    Glucose 102 70 - 125 mg/dL    Calcium 9.5 8.5 - 10.5 mg/dL    BUN 16 8 - 28 mg/dL    Creatinine 0.72 0.60 - 1.10 mg/dL    GFR MDRD Af Amer >60 >60 mL/min/1.73m2    GFR MDRD Non Af Amer >60 >60 mL/min/1.73m2   Glycosylated Hemoglobin A1c   Result Value Ref Range    Hemoglobin A1c 6.0 (H) <=5.6 %       Assessment:     1. Essential hypertension    2. Paroxysmal atrial fibrillation (H) - 3 short lived episodes in the past - Chooses aspirin for anticoagulation    3. Anxiety    4. Morbid obesity (H)    5. Abnormal finding of blood chemistry, unspecified     6. Left leg numbness    7. Controlled substance agreement signed - 8/20 - Lorazepam (intermittent use) - UDS 2/20    8. Bilateral exudative age-related macular degeneration, unspecified stage (H)    9. Sensation of fullness in ear, unspecified laterality        Quality review:     PHQ-2 Total Score: 0 (8/18/2020  8:00 AM)      No data recorded  ______________________________________________________________________     BMI Readings from Last 1 Encounters:   08/18/20 35.96 kg/m          Plan:     1. Continue current medications.  2. Controlled substance agreement done today.  3. Continue to work on weight loss.  4. Could see ENT in the future if needed for her ears.  5. Refill medications as needed and follow-up sooner if issues.         Denis Frias MD  General Internal Medicine  [facility]    Personal office fax - [phone]   Voice mail - [phone]  E-mail -  [email or web address]     Return in about 6 months (around 2/18/2021) for follow up visit.     No future appointments.      ______________________________________________________________________     Relevant ICD-10 codes and order associations:      ICD-10-CM    1. Essential hypertension  I10 Basic Metabolic Panel   2. Paroxysmal atrial fibrillation (H) - 3 short lived episodes in the past - Chooses aspirin for anticoagulation  I48.0 HM2(CBC w/o Differential)   3. Anxiety  F41.9    4. Morbid obesity (H)  E66.01 Glycosylated Hemoglobin A1c   5. Abnormal finding of blood chemistry, unspecified   R79.9 Glycosylated Hemoglobin A1c   6. Left leg numbness  R20.0    7. Controlled substance agreement signed - 8/20 - Lorazepam (intermittent use) - UDS 2/20  Z79.899    8. Bilateral exudative age-related macular degeneration, unspecified stage (H)  H35.3230    9. Sensation of fullness in ear, unspecified laterality  H93.8X9

## 2021-06-30 NOTE — PROGRESS NOTES
Progress Notes by Denis Frias MD at 2021  8:40 AM     Author: Denis Frias MD Service: -- Author Type: Physician    Filed: 2021 12:09 PM Encounter Date: 2021 Status: Signed    : Denis Frias MD (Physician)              [facility]    Comprehensive and complex medical care - Chronic disease management - Shared decision making - Care coordination - Compassionate care    Patient advocacy - Rational deprescribing - Minimally disruptive medicine - Ethical focus - Customized care          Date of Service: 2021  Primary Provider: Denis Frias MD    Patient Care Team:  Denis Frias MD as PCP - General (Internal Medicine)  Basim Rivera MD (Ophthalmology)  Tami De La Torre DC (Chiropractic Medicine)  Denis Frias MD as Assigned PCP     ______________________________________________________________________     Patient's Pharmacy:    [id number] [place], [place] - [address] AT [facility] [address]  [address]  [facility] [address]  Phone: [phone] Fax: [phone]     Patient's Contacts:  Name Home Phone Work Phone Mobile Phone Relationship Lgl Grd   SASHA DAVIS   [phone] Child    MIC MCKINNEY   [phone] Spouse      Patient's Insurance:    Payor/Plan Subscr  Sex Relation Sub. Ins. ID Effective Group Num   1. AARP - AARP DARLING MCKINNEY 1935 Female Self [id number]                                    [address]   2. MEDICARE - ME* DARLING MCKINNEY 1935 Female Self [id number]                                    [facility], [address]           Darling Mckinney is a 86 y.o. female who comes in today for:    Chief Complaint   Patient presents with   ? Follow-up     afib, left side of neck swelling/pain is doing better is getting massage 21   ? Medication Questions     is vit d3 bad to take if you get COVID       Active Problem List:  Problem List as  of 2/22/2021 Reviewed: 2/22/2021 12:06 PM by Denis Frias MD       High    Full code status    Nonsmoker    Paroxysmal atrial fibrillation (H) - 3 short lived episodes in the past - Chooses aspirin for anticoagulation       Medium    Controlled substance agreement signed - 8/20 - Lorazepam (intermittent use) - UDS 2/20    HTN (hypertension)    Anxiety    BPV (benign positional vertigo)       Low    Age-related macular degeneration, wet, both eyes (H)    AR (allergic rhinitis)    Morbid obesity (H)    OA (osteoarthritis) - hands and feet    RAHEEL (obstructive sleep apnea) - CPAP 9 cm H2O       Unprioritized    Primary insomnia           Current Outpatient Medications   Medication Sig Note   ? amLODIPine (NORVASC) 5 MG tablet Take 1 tablet (5 mg total) by mouth 2 (two) times a day.    ? aspirin 325 MG tablet Take 325 mg by mouth. 5/14/2015: Received from: [facility]   ? cholecalciferol, vitamin D3, 5,000 unit Tab Take by mouth.    ? fexofenadine (ALLEGRA) 60 MG tablet Take 60 mg by mouth daily.    ? hydroCHLOROthiazide (HYDRODIURIL) 25 MG tablet Take 1 tablet (25 mg total) by mouth daily.    ? LORazepam (ATIVAN) 0.5 MG tablet TAKE 1 TABLET BY MOUTH TWICE DAILY AS NEEDED FOR ANXIETY MAY REFILL EVERY 30 DAYS    ? magnesium 100 mg cap  5/14/2015: Received from: [facility]   ? OMEGA-3 FATTY ACIDS ORAL Take 2 g by mouth. 5/14/2015: Received from: [facility]   ? vit A/vit C/vit E/zinc/copper (PRESERVISION AREDS ORAL) Take by mouth daily.        Social History     Social History Narrative    Patient of Dr. Frias since 2005.         .   has severe COPD.        Daughter Kaila is a RN at [employer] in [place].        Sister is Radhika Odonnell, another patient of mine.       Subjective:     Comes in today for follow up.    No episodes of atrial fibrillation.  Can feel her heartbeat more when she is anxious.  Thought this happened today as she is not in atrial fibrillation on exam.  No  shortness of breath or chest pain.    Reviewed her hypertension today.  Blood pressure has been in the goal range.  Denies any excessive dizziness from the medication with this.     Still has occasional left radiculopathy pain when walks too far or does too much.    We reviewed her other issues noted in the assessment but not specifically addressed in the HPI above.     Objective:     Wt Readings from Last 3 Encounters:   02/22/21 214 lb (97.1 kg)   10/02/20 222 lb (100.7 kg)   08/18/20 212 lb 12.8 oz (96.5 kg)     BP Readings from Last 3 Encounters:   02/22/21 152/64   10/02/20 138/72   08/18/20 136/62     /64   Pulse 78   Wt 214 lb (97.1 kg)   SpO2 98%   BMI 36.17 kg/m     The patient is comfortable, no acute distress.  Mood good.  Insight good.  Eyes are nonicteric.  Neck is supple without mass.  No cervical adenopathy.  No thyromegaly. Heart regular rate and rhythm.  Murmur is stable.   Lungs clear to auscultation bilaterally.  Respiratory effort is good.  Abdomen soft and nontender.  Extremities no edema.      Diagnostics:     Results for orders placed or performed in visit on 10/02/20   Rapid Strep A Screen-Throat swab    Specimen: Throat   Result Value Ref Range    Rapid Strep A Antigen No Group A Strep detected, presumptive negative No Group A Strep detected, presumptive negative   Group A Strep, RNA Direct Detection, Throat    Specimen: Throat   Result Value Ref Range    Group A Strep by PCR No Group A Strep rRNA detected No Group A Strep rRNA detected       Quality review:     PHQ-2 Total Score: 0 (2/22/2021  9:00 AM)    PHQ-9 Total Score: 1 (2/22/2021  9:00 AM)    ______________________________________________________________________     BMI Readings from Last 1 Encounters:   02/22/21 36.17 kg/m        Assessment and Plan:     1. Bilateral exudative age-related macular degeneration, unspecified stage (H)  Follow up with the ophthalmologist.    2. Paroxysmal atrial fibrillation (H)  Continue to  monitor.  No change in treatment.    3. Morbid obesity (H)  Work on weight loss.  Weight is down.    4. Essential hypertension  Continue current medications     5. Anxiety  Consider update of CSA (controlled substance agreement) at the next visit.   Review UDS/DOA screen at next visit if appropriate.     6. RAHEEL (obstructive sleep apnea) - CPAP 9 cm H2O  Obstructive sleep apnea is doing well without any CPAP problems and no excessive somnolence.  The patient uses CPAP nightly and benefits from it.           Denis Frias MD  General Internal Medicine  [facility]      Return in about 6 months (around 8/22/2021), or if symptoms worsen or fail to improve, for visit and blood work.     Future Appointments   Date Time Provider Department Center   8/23/2021  9:55 AM Denis Frias MD [facility]         HCC issues resolved at this visit.

## 2021-08-23 ENCOUNTER — OFFICE VISIT (OUTPATIENT)
Dept: INTERNAL MEDICINE | Facility: CLINIC | Age: 86
End: 2021-08-23
Payer: MEDICARE

## 2021-08-23 VITALS
DIASTOLIC BLOOD PRESSURE: 64 MMHG | HEART RATE: 60 BPM | OXYGEN SATURATION: 96 % | BODY MASS INDEX: 35.94 KG/M2 | HEIGHT: 65 IN | SYSTOLIC BLOOD PRESSURE: 136 MMHG | WEIGHT: 215.7 LBS

## 2021-08-23 DIAGNOSIS — Z51.81 ENCOUNTER FOR THERAPEUTIC DRUG LEVEL MONITORING: ICD-10-CM

## 2021-08-23 DIAGNOSIS — R06.00 DYSPNEA, UNSPECIFIED TYPE: ICD-10-CM

## 2021-08-23 DIAGNOSIS — I10 ESSENTIAL HYPERTENSION: Primary | ICD-10-CM

## 2021-08-23 DIAGNOSIS — Z79.899 CONTROLLED SUBSTANCE AGREEMENT SIGNED: Chronic | ICD-10-CM

## 2021-08-23 DIAGNOSIS — G47.33 OSA (OBSTRUCTIVE SLEEP APNEA): ICD-10-CM

## 2021-08-23 DIAGNOSIS — F41.9 ANXIETY: ICD-10-CM

## 2021-08-23 DIAGNOSIS — H81.10 BENIGN PAROXYSMAL POSITIONAL VERTIGO, UNSPECIFIED LATERALITY: ICD-10-CM

## 2021-08-23 PROBLEM — R07.9 CHEST PAIN WITH NORMAL ANGIOGRAPHY: Status: ACTIVE | Noted: 2021-08-23

## 2021-08-23 LAB
AMPHETAMINES UR QL SCN: NORMAL
ANION GAP SERPL CALCULATED.3IONS-SCNC: 15 MMOL/L (ref 5–18)
BARBITURATES UR QL: NORMAL
BENZODIAZ UR QL: NORMAL
BNP SERPL-MCNC: 26 PG/ML (ref 0–167)
BUN SERPL-MCNC: 15 MG/DL (ref 8–28)
CALCIUM SERPL-MCNC: 10.2 MG/DL (ref 8.5–10.5)
CANNABINOIDS UR QL SCN: NORMAL
CHLORIDE BLD-SCNC: 105 MMOL/L (ref 98–107)
CO2 SERPL-SCNC: 23 MMOL/L (ref 22–31)
COCAINE UR QL: NORMAL
CREAT SERPL-MCNC: 0.73 MG/DL (ref 0.6–1.1)
CREAT UR-MCNC: 32 MG/DL
ERYTHROCYTE [DISTWIDTH] IN BLOOD BY AUTOMATED COUNT: 15.1 % (ref 10–15)
GFR SERPL CREATININE-BSD FRML MDRD: 76 ML/MIN/1.73M2
GLUCOSE BLD-MCNC: 105 MG/DL (ref 70–125)
HCT VFR BLD AUTO: 41.2 % (ref 35–47)
HGB BLD-MCNC: 13.4 G/DL (ref 11.7–15.7)
MCH RBC QN AUTO: 26 PG (ref 26.5–33)
MCHC RBC AUTO-ENTMCNC: 32.5 G/DL (ref 31.5–36.5)
MCV RBC AUTO: 80 FL (ref 78–100)
METHADONE UR QL SCN: NORMAL
OPIATES UR QL SCN: NORMAL
OXYCODONE UR QL: NORMAL
PCP UR QL SCN: NORMAL
PLATELET # BLD AUTO: 254 10E3/UL (ref 150–450)
POTASSIUM BLD-SCNC: 4.5 MMOL/L (ref 3.5–5)
RBC # BLD AUTO: 5.15 10E6/UL (ref 3.8–5.2)
SODIUM SERPL-SCNC: 143 MMOL/L (ref 136–145)
WBC # BLD AUTO: 6.1 10E3/UL (ref 4–11)

## 2021-08-23 PROCEDURE — 83880 ASSAY OF NATRIURETIC PEPTIDE: CPT | Performed by: INTERNAL MEDICINE

## 2021-08-23 PROCEDURE — 99214 OFFICE O/P EST MOD 30 MIN: CPT | Performed by: INTERNAL MEDICINE

## 2021-08-23 PROCEDURE — 85027 COMPLETE CBC AUTOMATED: CPT | Performed by: INTERNAL MEDICINE

## 2021-08-23 PROCEDURE — 36415 COLL VENOUS BLD VENIPUNCTURE: CPT | Performed by: INTERNAL MEDICINE

## 2021-08-23 PROCEDURE — 80048 BASIC METABOLIC PNL TOTAL CA: CPT | Performed by: INTERNAL MEDICINE

## 2021-08-23 PROCEDURE — 80307 DRUG TEST PRSMV CHEM ANLYZR: CPT | Performed by: INTERNAL MEDICINE

## 2021-08-23 ASSESSMENT — MIFFLIN-ST. JEOR: SCORE: 1421.35

## 2021-08-23 NOTE — LETTER
8/24/2021    Francesca Jay   [address] [address] [place] [id number]         Dear Francesca,    It was good to see you in clinic.  I hope your questions were answered at the time of your visit.    The results of your tests from your visit were as follows:    Resulted Orders   Basic metabolic panel   Result Value Ref Range    Sodium 143 136 - 145 mmol/L    Potassium 4.5 3.5 - 5.0 mmol/L    Chloride 105 98 - 107 mmol/L    Carbon Dioxide (CO2) 23 22 - 31 mmol/L    Anion Gap 15 5 - 18 mmol/L    Urea Nitrogen 15 8 - 28 mg/dL    Creatinine 0.73 0.60 - 1.10 mg/dL    Calcium 10.2 8.5 - 10.5 mg/dL    Glucose 105 70 - 125 mg/dL    GFR Estimate 76 >60 mL/min/1.73m2      Comment:      As of July 11, 2021, eGFR is calculated by the CKD-EPI creatinine equation, without race adjustment. eGFR can be influenced by muscle mass, exercise, and diet. The reported eGFR is an estimation only and is only applicable if the renal function is stable.   CBC with platelets   Result Value Ref Range    WBC Count 6.1 4.0 - 11.0 10e3/uL    RBC Count 5.15 3.80 - 5.20 10e6/uL    Hemoglobin 13.4 11.7 - 15.7 g/dL    Hematocrit 41.2 35.0 - 47.0 %    MCV 80 78 - 100 fL    MCH 26.0 (L) 26.5 - 33.0 pg    MCHC 32.5 31.5 - 36.5 g/dL    RDW 15.1 (H) 10.0 - 15.0 %    Platelet Count 254 150 - 450 10e3/uL   B-Type Natriuretic Peptide ([facility] Only)   Result Value Ref Range    BNP 26 0 - 167 pg/mL   Drugs of Abuse 1+ Panel, Urine ([facility] Only)   Result Value Ref Range    Amphetamines Urine Screen Negative Screen Negative    Benzodiazepines Urine Screen Negative Screen Negative    Opiates Urine Screen Negative Screen Negative    PCP Urine Screen Negative Screen Negative    Cannabinoids Urine Screen Negative Screen Negative    Barbiturates Urine Screen Negative Screen Negative    Cocaine Urine Screen Negative Screen Negative    Methadone Urine Screen Negative Screen Negative    Oxycodone Urine Screen Negative Screen Negative    Creatinine Urine mg/dL 32 mg/dL     Narrative    Drug                           Screening Threshold    Amphetamines                    1000 ng/mL  Benzodiazepine                   200 ng/mL  Opiates                          300 ng/mL  Phencyclidine                     25 ng/mL  THC Metabolite                    50 ng/mL  Barbiturates                     200 ng/mL  Cocaine Metabolite               150 ng/mL  Methadone                        300 ng/mL  Oxycodone                        100 ng/mL    Screening results are to be used only for medical purposes.  Unconfirmed screening results are not to be used for non-  medical purposes.     Your blood counts are normal and you are not anemic.     Your kidney tests are normal.  Your electrolytes are also normal.  There is no signs of diabetes.     Your heart tests were very good.     Your urine drug screen was normal and as expected.  This is required by governmental entities for anyone on a controlled substance and will be done approximately once a year.     Please follow up again in 6 months, sooner if issues.    If you have any questions regarding these results, please feel free to contact me at [phone].  I wish you the best of health!      Sincerely,       Denis Frias MD  General Internal Medicine  [facility]

## 2021-08-23 NOTE — PROGRESS NOTES
[facility] - Primary Care Specialists    Comprehensive and complex medical care - Chronic disease management - Shared decision making - Care coordination - Compassionate care    Patient advocacy - Rational deprescribing - Minimally disruptive medicine - Ethical focus - Customized care         Date of Service: 8/23/2021  Primary Provider: Denis Frias    Patient Care Team:  Denis Frias MD as PCP - General  Denis Frias MD as Assigned PCP  Tami De La Torre MD Downie, Alan A, MD as MD (Ophthalmology)          Patient's Pharmacy:    [id number] - [place], [place] - [address]  [address]  [facility] [address]  Phone: [phone] Fax: [phone]     Patient's Contacts:  Name Home Phone Work Phone Mobile Phone Relationship Lgl Gravery   SASHA DAVIS   [phone] Other    FAYEMIC   [phone] Spouse      Patient's Insurance:    Payor: MEDICARE / Plan: MEDICARE / Product Type: Medicare /           Active Problem List:  Problem List as of 8/23/2021 Reviewed: 8/23/2021 12:39 PM by Denis Frias MD       High    Nonsmoker    Full code status    Paroxysmal atrial fibrillation (H) - 3 short lived episodes in the past - Chooses aspirin for anticoagulation       Medium    HTN (hypertension)    Controlled substance agreement signed - 8/21 - Lorazepam (intermittent use) - UDS 8/21    Anxiety    RAHEEL (obstructive sleep apnea) - CPAP 9 cm H2O       Low    BPV (benign positional vertigo)    AR (allergic rhinitis)    Age-related macular degeneration, wet, both eyes (H)    OA (osteoarthritis) - hands and feet    Morbid obesity (H)    Primary insomnia    Chest pain with normal angiography           Current Outpatient Medications   Medication Instructions     amLODIPine (NORVASC) 5 mg, Oral, 2 TIMES DAILY     aspirin (ASA) 325 mg     cholecalciferol, vitamin D3, 5,000 unit Tab [CHOLECALCIFEROL, VITAMIN D3, 5,000 UNIT TAB] Take  by mouth.     fexofenadine (ALLEGRA) 60 mg, DAILY     hydrochlorothiazide (HYDRODIURIL) 25 mg, Oral, DAILY     LORazepam (ATIVAN) 0.5 MG tablet [LORAZEPAM (ATIVAN) 0.5 MG TABLET] TAKE 1 TABLET BY MOUTH TWICE DAILY AS NEEDED FOR ANXIETY.     magnesium 100 mg cap [MAGNESIUM 100 MG CAP]      OMEGA-3 FATTY ACIDS ORAL 2 g     vit A/vit C/vit E/zinc/copper (PRESERVISION AREDS ORAL) DAILY     Social History     Social History Narrative    Patient of Dr. Frias since 2005.     .   has severe COPD.    Daughter Kaila is a RN at [employer] in [place].    Sister is Radhika Odonnell, another patient of mine.       Subjective:     Francesca Jay is a 86 year old female who comes in today for:    Chief Complaint   Patient presents with     RECHECK     afib, bp     Depression      Patient comes in today for follow-up of a number of issues.    We first reviewed her blood pressure and this is generally doing well.  She has an occasional high.  She is doing pretty well overall.    Reviewed her anxiety.  She feels more anxious and down with the Covid outbreak and now the length of the overall outbreak.  Her  is limited due to his severe lung disease.  He might be up and about 6 hours out of 24 hours in a day.  She has a new great granddaughter who she cannot see due to the family's refusal to vaccinate.    She gets an occasional vertigo episode but nothing for quite a while.    Her weight is overall doing well.  She has not been as active although she still walks 6 out of 7 days.    She has some neck pain which she has had chronically but this has not worsened.    We reviewed her other issues noted in the assessment but not specifically addressed in the HPI above.     Objective:     Wt Readings from Last 3 Encounters:   08/23/21 97.8 kg (215 lb 11.2 oz)   02/22/21 97.1 kg (214 lb)   10/02/20 100.7 kg (222 lb)     BP Readings from Last 3 Encounters:   08/23/21 136/64   02/22/21 (!) 152/64   10/02/20  "138/72     /64   Pulse 60   Ht 1.638 m (5' 4.5\")   Wt 97.8 kg (215 lb 11.2 oz)   SpO2 96%   BMI 36.45 kg/m     The patient is comfortable, no acute distress.  Mood good.  Insight good.  Eyes are nonicteric.  Neck is supple without mass.  No cervical adenopathy.  No thyromegaly. Heart regular rate and rhythm.  Stable aortic heart murmur.  Lungs clear to auscultation bilaterally.  Respiratory effort is good.  Abdomen soft and nontender.  No hepatosplenomegaly.  Extremities trace edema.      Diagnostics:     Office Visit - [facility] on 10/02/2020   Component Date Value Ref Range Status     Group A Strep antigen 10/02/2020 No Group A Strep detected, presumptive negative  No Group A Strep detected, presumptive negative Final     Group A strep by PCR 10/02/2020 No Group A Strep rRNA detected  No Group A Strep rRNA detected Final       No results found for any visits on 08/23/21.    PHQ-2 Score:    No flowsheet data found.    Assessment:     1. Essential hypertension    2. Encounter for therapeutic drug level monitoring    3. Dyspnea, unspecified type    4. Anxiety    5. Benign paroxysmal positional vertigo, unspecified laterality    6. RAHEEL (obstructive sleep apnea) - CPAP 9 cm H2O    7. Controlled substance agreement signed - 8/21 - Lorazepam (intermittent use) - UDS 8/21        Plan:     1. Blood work and urine test done today.  2. Renewed controlled substance agreement.  3. Follow-up again in 6 months if otherwise doing well.  4. Dyspnea is minimal but will check lab work.  5. Continue current medications otherwise.  6. Follow up sooner if issues.       Denis Frias MD  General Internal Medicine  [facility]    Return in about 6 months (around 2/23/2022).     No future appointments.     "

## 2021-08-23 NOTE — LETTER
Francesca Jay   [address] [address] [place] [id number]         Dear Francesca,    I am sending you this letter to remind you that you are due for a follow up visit in February.    Please call to schedule this visit as soon as possible as our schedule fills up quickly.    If you already have an appointment scheduled with me for around this time, please ignore this notification.    I look forward to seeing you to review your overall health.           Sincerely,       Denis Frias MD  General Internal Medicine  [facility]

## 2021-08-23 NOTE — LETTER
[facility]  08/23/21  Patient: Francesca Jay  YOB: 1935  Medical Record Number: [id number]                                                                                  MEDICATION: LORAZEPAM     Non-Opioid Controlled Substance Agreement    This is an agreement between you and your provider regarding safe and appropriate use of controlled substances prescribed by your care team. Controlled substances are?medicines that can cause physical and mental dependence (abuse).     There are strict laws about having and using these medicines. We here at [facility] are  committed to working with you in your efforts to get better. To support you in this work, we'll help you schedule regular office appointments for medicine refills. If we must cancel or change your appointment for any reason, we'll make sure you have enough medicine to last until your next appointment.     As a Provider, I will:     Listen carefully to your concerns while treating you with respect.     Recommend a treatment plan that I believe is in your best interest and may involve therapies other than medicine.      Talk with you often about the possible benefits and the risk of harm of any medicine that we prescribe for you.    Assess the safety of this medicine and check how well it works.      Provide a plan on how to taper (discontinue or go off) using this medicine if the decision is made to stop its use.      ::  As a Patient, I understand controlled substances:       Are prescribed by my care provider to help me function or work and manage my condition(s).?    Are strong medicines and can cause serious side effects.       Need to be taken exactly as prescribed.?Combining controlled substances with certain medicines or chemicals (such as illegal drugs, alcohol, sedatives, sleeping pills, and benzodiazepines) can be dangerous or even fatal.? If I stop taking my medicines suddenly, I may have severe  withdrawal symptoms.     The risks, benefits, and side effects of these medicine(s) were explained to me. I agree that:    1. I will take part in other treatments as advised by my care team. This may be psychiatry or counseling, physical therapy, behavioral therapy, group treatment or a referral to specialist.    2. I will keep all my appointments and understand this is part of the monitoring of controlled substances.?My care team may require an office visit for EVERY controlled substance refill. If I miss appointments or don t follow instructions, my care team may stop my medicine    3. I will take my medicines as prescribed. I will not change the dose or schedule unless my care team tells me to. There will be no refills if I run out early.      4. I may be asked to come to the clinic and complete a urine drug test or complete a pill count. If I don t give a urine sample or participate in a pill count, the care team may stop my medicine.    5. I will only receive controlled substance prescriptions from this clinic. If I am treated by another provider, I will tell them that I am taking controlled substances and that I have a treatment agreement with this provider. I will inform my [facility] care team within one business day if I am given a prescription for any controlled substance by another healthcare provider. My [facility] care team can contact other providers and pharmacists about my use of any medicines.    6. It is up to me to make sure that I don't run out of my medicines on weekends or holidays.?If my care team is willing to refill my prescription without a visit, I must request refills only during office hours. Refills may take up to 3 business days to process. I will use one pharmacy to fill all my controlled substance prescriptions. I will notify the clinic about any changes to my insurance or medicine availability.    7. I am responsible for my prescriptions. If the medicine/prescription  is lost, stolen or destroyed, it will not be replaced.?I also agree not to share controlled substance medicines with anyone.     8. I am aware I should not use any illegal or recreational drugs. I agree not to drink alcohol unless my care team says I can.     9. If I enroll in the [place] Medical Cannabis program, I will tell my care team before my next refill.    10. I will tell my care team right away if I become pregnant, have a new medical problem treated outside of my regular clinic, or have a change in my medicines.     11. I understand that this medicine can affect my thinking, judgment and reaction time.? Alcohol and drugs affect the brain and body, which can affect the safety of my driving. Being under the influence of alcohol or drugs can affect my decision-making, behaviors, personal safety and the safety of others. Driving while impaired (DWI) can occur if a person is driving, operating or in physical control of a car, motorcycle, boat, snowmobile, ATV, motorbike, off-road vehicle or any other motor vehicle ([place] Statute 169A.20). I understand the risk if I choose to drive or operate any vehicle or machinery.    I understand that if I do not follow any of the conditions above, my prescriptions or treatment may be stopped or changed.   I agree that my provider, clinic care team and pharmacy may work with any city, state or federal law enforcement agency that investigates the misuse, sale or other diversion of my controlled medicine. I will allow my provider to discuss my care with, or share a copy of, this agreement with any other treating provider, pharmacy or emergency room where I receive care.     I have read this agreement and have asked questions about anything I did not understand.    ________________________________________________________  Patient Signature - Francesca Jay     ___________________                   Date     ________________________________________________________  Provider  Signature - Denis Ardolf, MD       ___________________                   Date     ________________________________________________________  Witness Signature (required if provider not present while patient signing)          ___________________                   Date

## 2021-08-23 NOTE — PATIENT INSTRUCTIONS
Please follow up if you have any further issues.    You may contact me by phone or MyChart if you are worsening or if things are not improving.    ______________________________________________________________________     Please remember that you can call [phone] to schedule an appointment.     You can schedule appointments 24 hours a day, 7 days a week.  Sometimes the best time to schedule an appointment is after clinic hours when less people are calling in.  Weekends are another option for calling in to schedule appointments.

## 2021-10-25 ENCOUNTER — MEDICAL CORRESPONDENCE (OUTPATIENT)
Dept: HEALTH INFORMATION MANAGEMENT | Facility: CLINIC | Age: 86
End: 2021-10-25
Payer: MEDICARE

## 2021-10-25 DIAGNOSIS — F41.9 ANXIETY: ICD-10-CM

## 2021-10-25 RX ORDER — LORAZEPAM 0.5 MG/1
TABLET ORAL
Qty: 30 TABLET | Refills: 1 | Status: SHIPPED | OUTPATIENT
Start: 2021-10-25

## 2021-10-25 NOTE — TELEPHONE ENCOUNTER
Pharmacy requesting refill of Lorazepam  Pt last seen 8/23/21  Due to be seen around 2/23/22  Plan:      1. Blood work and urine test done today.  2. Renewed controlled substance agreement.  3. Follow-up again in 6 months if otherwise doing well.  4. Dyspnea is minimal but will check lab work.  5. Continue current medications otherwise.  6. Follow up sooner if issues.        Denis Frias MD  General Internal Medicine  [facility]     Return in about 6 months (around 2/23/2022).

## 2021-11-29 NOTE — TELEPHONE ENCOUNTER
Informed pt of Dr. Frias's message.  She states an understanding.    Pt scheduled for 10/2/20 at 11:20.  She thanked for the call.  
Please call patient -    ______________________________________________________________________     Home phone:  [phone] (home)     Cell phone:   Telephone Information:   Mobile [phone]       Other contacts:  Name Home Phone Work Phone Mobile Phone Relationship Lgl Gravery CAICEDOSASHA GREEN   [phone] Child    MIC MCKINNEY   [phone] Spouse      ______________________________________________________________________     I would like to see her in clinic ideally for the swelling and pain in her neck as I have no seen her for this.    This could be a saliva gland issue.  She could push fluids and use lemon drops for the time being.    Please put her in a same day slot for tomorrow and see if she can come in.    Denis Frias MD  [facility]  10/1/2020, 12:46 PM    
no

## 2021-12-20 NOTE — PROGRESS NOTES
Progress Notes by Denis Frias MD at 2/27/2018  9:15 AM     Author: Denis Frias MD Service: -- Author Type: Physician    Filed: 2/28/2018 12:05 PM Encounter Date: 2/27/2018 Status: Signed    : Denis Frias MD (Physician)              [facility]/Primary Care Specialists    Comprehensive and complex medical care - Chronic disease management - Shared decision making - Care coordination - Compassionate care    Patient advocacy - Rational deprescribing - Minimally disruptive medicine - Ethical focus - Customized care    ______________________________________________________________________     Date of Service: 2/27/2018  Primary Provider: Denis Frias MD    Patient Care Team:  Denis Frias MD as PCP - General (Internal Medicine)  Basim Rivera MD (Ophthalmology)  Tami De La Torre DC (Chiropractic Medicine)     ______________________________________________________________________     Patient's Pharmacy:    [facility] -[address] [address]  Phone: [phone] Fax: [phone]     Patient's Contacts:  Name Home Phone Work Phone Mobile Phone Relationship Lg SASHA Hodges   [phone] Child    MIC MCKINNEY   [phone] Spouse      Patient's Insurance:    Payor: MEDICARE / Plan: MEDICARE A AND B / Product Type: Medicare /     ______________________________________________________________________     Francesca Mckinney is 83 y.o. female who comes in today for:    Chief Complaint   Patient presents with   ? Follow-up     jaw tightness comes and goes, anxiety, leg numbness comes and goes if on hard surface       Patient Active Problem List   Diagnosis   ? HTN (hypertension)   ? Anxiety   ? BPV (benign positional vertigo)   ? AR (allergic rhinitis)   ? Macular degeneration (senile) of retina   ? RAHEEL (obstructive sleep apnea) - CPAP 9 cm H2O   ? OA (osteoarthritis) - hands and feet   ?  Paroxysmal atrial fibrillation - 2 short lived episodes in the past - Chooses aspirin for anticoagulation   ? Nonsmoker   ? Full code status   ? Controlled substance agreement signed - 8/17 - Lorazepam (intermittent use) - UDS 2/18     Current Outpatient Prescriptions   Medication Sig Note   ? amLODIPine (NORVASC) 5 MG tablet Take 1 tablet (5 mg total) by mouth 2 (two) times a day.    ? aspirin 325 MG tablet Take 325 mg by mouth. 5/14/2015: Received from: [facility]   ? cholecalciferol, vitamin D3, 5,000 unit Tab Take by mouth.    ? hydroCHLOROthiazide (HYDRODIURIL) 25 MG tablet Take 1 tablet (25 mg total) by mouth daily.    ? LORazepam (ATIVAN) 0.5 MG tablet Take 1 tablet (0.5 mg total) by mouth every 6 (six) hours as needed for anxiety.    ? magnesium 100 mg cap  5/14/2015: Received from: [facility]   ? OMEGA-3 FATTY ACIDS ORAL Take 2 g by mouth. 5/14/2015: Received from: [facility]   ? UNABLE TO FIND Med Name: OTC  Presen- Vision Areds 2 Formula      Social History     Social History Narrative    Patient of Dr. Frias since 2005.         .   has severe COPD.        Daughter Kaila is a RN at [employer] in [place].        Sister in law is Radhika Odonnell, another patient of mine.     ______________________________________________________________________     History of present illness:    Under some stress in the last 6 months as  with COPD has been sick and in and out of the hospital.  Had ankle fracture, angioedema, and delirium.  In the NH as well.  Sees Dr. Martinez at this time.    Reviewed the patient's anxiety and this is doing okay considering.  Still just using the lorazepam prn.    Obstructive sleep apnea is doing well without any CPAP problems and no excessive somnolence.  She uses the CPAP regularly and continues to derive benefit from its use.     Reviewed her hypertension today.  Blood pressure has been in the goal range.  Denies any excessive dizziness from  "the medication with this. Her home measurements have been doing very well.  Compliant ith medication.    No exertional chest pain or jaw tightness.  Still has the left leg numbness but does not want further workup.    On review of systems, the patient denies any chest pain, but might have some very slight shortness of breath.    ______________________________________________________________________    Exam:    Wt Readings from Last 3 Encounters:   02/27/18 (!) 236 lb 1.6 oz (107.1 kg)   08/15/17 (!) 238 lb 3.2 oz (108 kg)   03/10/17 (!) 237 lb 9.6 oz (107.8 kg)     BP Readings from Last 3 Encounters:   02/27/18 132/74   08/15/17 140/70   03/12/17 136/64     /74  Pulse (!) 51  Ht 5' 4.5\" (1.638 m)  Wt (!) 236 lb 1.6 oz (107.1 kg)  BMI 39.9 kg/m2   The patient is comfortable, no acute distress.  Mood good.  Insight good.  Eyes are nonicteric.  Neck is supple without mass.  No cervical adenopathy.  No thyromegaly. Heart regular rate and rhythm. 3/6 systolic murmur in the aortic position. Lungs clear to auscultation bilaterally.  Respiratory effort is good.  Abdomen soft and nontender.  No hepatosplenomegaly.  Extremities no edema.      ______________________________________________________________________    Diagnostics:    Results for orders placed or performed in visit on 02/27/18   Urinalysis Macro & Micro   Result Value Ref Range    Color, UA Yellow Colorless, Yellow, Straw, Light Yellow    Clarity, UA Clear Clear    Glucose, UA Negative Negative    Bilirubin, UA Negative Negative    Ketones, UA Negative Negative    Specific Gravity, UA 1.015 1.005 - 1.030    Blood, UA Negative Negative    pH, UA 7.0 5.0 - 8.0    Protein, UA Negative Negative mg/dL    Urobilinogen, UA 0.2 E.U./dL 0.2 E.U./dL, 1.0 E.U./dL    Nitrite, UA Negative Negative    Leukocytes, UA Negative Negative    Bacteria, UA Few (!) None Seen hpf    RBC, UA 0-2 None Seen, 0-2 hpf    WBC, UA 0-5 None Seen, 0-5 hpf    Squam Epithel, UA 0-5 None " Seen, 0-5 lpf   Drug Abuse 1+, Urine   Result Value Ref Range    Amphetamines Screen Negative Screen Negative    Benzodiazepines Screen Negative Screen Negative    Opiates Screen Negative Screen Negative    Phencyclidine Screen Negative Screen Negative    THC Screen Negative Screen Negative    Barbiturates Screen Negative Screen Negative    Cocaine Metabolite Screen Negative Screen Negative    Methadone Screen Negative Screen Negative    Oxycodone Screen Negative Screen Negative    Creatinine, Urine 23.2 mg/dL   HM2(CBC w/o Differential)   Result Value Ref Range    WBC 5.5 4.0 - 11.0 thou/uL    RBC 5.18 3.80 - 5.40 mill/uL    Hemoglobin 13.3 12.0 - 16.0 g/dL    Hematocrit 40.2 35.0 - 47.0 %    MCV 78 (L) 80 - 100 fL    MCH 25.6 (L) 27.0 - 34.0 pg    MCHC 33.0 32.0 - 36.0 g/dL    RDW 14.3 11.0 - 14.5 %    Platelets 216 140 - 440 thou/uL    MPV 8.7 7.0 - 10.0 fL   BNP(B-type Natriuretic Peptide)   Result Value Ref Range    BNP 33 0 - 159 pg/mL   Basic Metabolic Panel   Result Value Ref Range    Sodium 141 136 - 145 mmol/L    Potassium 4.6 3.5 - 5.0 mmol/L    Chloride 107 98 - 107 mmol/L    CO2 26 22 - 31 mmol/L    Anion Gap, Calculation 8 5 - 18 mmol/L    Glucose 105 70 - 125 mg/dL    Calcium 9.2 8.5 - 10.5 mg/dL    BUN 13 8 - 28 mg/dL    Creatinine 0.72 0.60 - 1.10 mg/dL    GFR MDRD Af Amer >60 >60 mL/min/1.73m2    GFR MDRD Non Af Amer >60 >60 mL/min/1.73m2      ______________________________________________________________________    Assessment:    1. HTN (hypertension)    2. RAHEEL (obstructive sleep apnea) - CPAP 9 cm H2O    3. Anxiety    4. SOB (shortness of breath)    5. Encounter for therapeutic drug level monitoring        ______________________________________________________________________      PHQ-2 Total Score: 0 (2/27/2018  9:00 AM)  No Data Recorded  ______________________________________________________________________    Plan:    1. Check blood work today.  2. Continue current medication.  3. Continue  CPAP.  4. Refilled due medication.  5. Follow up sooner if issues.    Denis Frias MD  General Internal Medicine  [facility]     Return in about 6 months (around 8/27/2018) for follow up visit.     No future appointments.          First Trimester Sonogram

## 2022-01-06 ENCOUNTER — TELEPHONE (OUTPATIENT)
Dept: INTERNAL MEDICINE | Facility: CLINIC | Age: 87
End: 2022-01-06
Payer: MEDICARE

## 2022-01-06 DIAGNOSIS — M54.50 LUMBAR BACK PAIN: Primary | ICD-10-CM

## 2022-01-06 RX ORDER — TIZANIDINE 2 MG/1
2 TABLET ORAL 3 TIMES DAILY PRN
Qty: 30 TABLET | Refills: 1 | Status: SHIPPED | OUTPATIENT
Start: 2022-01-06

## 2022-01-06 NOTE — TELEPHONE ENCOUNTER
Pt last seen 8/23/21  Plan:      1. Blood work and urine test done today.  2. Renewed controlled substance agreement.  3. Follow-up again in 6 months if otherwise doing well.  4. Dyspnea is minimal but will check lab work.  5. Continue current medications otherwise.  6. Follow up sooner if issues.        Denis Frias MD  General Internal Medicine  [facility]     Return in about 6 months (around 2/23/2022).

## 2022-01-06 NOTE — TELEPHONE ENCOUNTER
Please call patient -    ______________________________________________________________________     Home phone:  [phone] (home)     Cell phone:   Telephone Information:   Mobile [phone]       Other contacts:  Name Home Phone Work Phone Mobile Phone Relationship Lgl SASHA Hodges   [phone] Other    FAYEMIC   [phone] Spouse      ______________________________________________________________________     Sent in a prescription for tizanidine (Zanaflex).     Please see if she needs any other follow up with me at this time.    Denis Frias MD  [facility]  1/6/2022, 9:57 AM

## 2022-01-06 NOTE — TELEPHONE ENCOUNTER
Pt is requesting a muscle relaxer from the advice of her Chiropractor,  Dr. Tami De La Torre.    Pt reports that she hurt her back two days ago, lifting something heavy - about 10 lbs.    2022 Patient insurance requires RX's to be filled at [facility].  Pt prefers the following:    [facility]  [address] [place] [address]  [phone]    Last visit with PCP = 08/23/21

## 2022-01-06 NOTE — TELEPHONE ENCOUNTER
Informed pt of Dr. Frias's message.  Pt scheduled for 2/25/2022 at 9:00 AM.  Verified with pt that [facility] can be deleted out of her chart.  She thanked for the call.

## 2022-04-21 DIAGNOSIS — I10 ESSENTIAL HYPERTENSION: ICD-10-CM

## 2022-04-21 RX ORDER — AMLODIPINE BESYLATE 5 MG/1
TABLET ORAL
Qty: 0.1 TABLET | Refills: 0 | Status: SHIPPED | OUTPATIENT
Start: 2022-04-21

## 2022-04-21 RX ORDER — HYDROCHLOROTHIAZIDE 25 MG/1
TABLET ORAL
Qty: 0.001 TABLET | Refills: 0 | Status: SHIPPED | OUTPATIENT
Start: 2022-04-21